# Patient Record
Sex: FEMALE | Race: WHITE | NOT HISPANIC OR LATINO | URBAN - METROPOLITAN AREA
[De-identification: names, ages, dates, MRNs, and addresses within clinical notes are randomized per-mention and may not be internally consistent; named-entity substitution may affect disease eponyms.]

---

## 2017-11-04 ENCOUNTER — OUTPATIENT (OUTPATIENT)
Dept: OUTPATIENT SERVICES | Facility: HOSPITAL | Age: 72
LOS: 1 days | End: 2017-11-04

## 2017-11-04 ENCOUNTER — INPATIENT (INPATIENT)
Facility: HOSPITAL | Age: 72
LOS: 4 days | Discharge: HOME | End: 2017-11-09
Payer: MEDICARE

## 2017-11-04 PROCEDURE — 71020: CPT | Mod: 26

## 2017-11-04 PROCEDURE — 99285 EMERGENCY DEPT VISIT HI MDM: CPT

## 2017-11-05 ENCOUNTER — OUTPATIENT (OUTPATIENT)
Dept: OUTPATIENT SERVICES | Facility: HOSPITAL | Age: 72
LOS: 1 days | End: 2017-11-05

## 2017-11-06 ENCOUNTER — OUTPATIENT (OUTPATIENT)
Dept: OUTPATIENT SERVICES | Facility: HOSPITAL | Age: 72
LOS: 1 days | End: 2017-11-06

## 2017-11-07 ENCOUNTER — OUTPATIENT (OUTPATIENT)
Dept: OUTPATIENT SERVICES | Facility: HOSPITAL | Age: 72
LOS: 1 days | End: 2017-11-07

## 2017-11-07 PROCEDURE — 71020: CPT | Mod: 26

## 2017-11-08 ENCOUNTER — OUTPATIENT (OUTPATIENT)
Dept: OUTPATIENT SERVICES | Facility: HOSPITAL | Age: 72
LOS: 1 days | End: 2017-11-08

## 2017-11-09 ENCOUNTER — OUTPATIENT (OUTPATIENT)
Dept: OUTPATIENT SERVICES | Facility: HOSPITAL | Age: 72
LOS: 1 days | End: 2017-11-09

## 2017-11-13 ENCOUNTER — OUTPATIENT (OUTPATIENT)
Dept: OUTPATIENT SERVICES | Facility: HOSPITAL | Age: 72
LOS: 1 days | End: 2017-11-13

## 2017-12-15 ENCOUNTER — OUTPATIENT (OUTPATIENT)
Dept: OUTPATIENT SERVICES | Facility: HOSPITAL | Age: 72
LOS: 1 days | End: 2017-12-15

## 2018-03-08 ENCOUNTER — OUTPATIENT (OUTPATIENT)
Dept: OUTPATIENT SERVICES | Facility: HOSPITAL | Age: 73
LOS: 1 days | End: 2018-03-08

## 2019-02-05 PROBLEM — Z00.00 ENCOUNTER FOR PREVENTIVE HEALTH EXAMINATION: Status: ACTIVE | Noted: 2019-02-05

## 2019-02-05 PROCEDURE — 70450 CT HEAD/BRAIN W/O DYE: CPT | Mod: 26

## 2019-02-05 PROCEDURE — 99285 EMERGENCY DEPT VISIT HI MDM: CPT

## 2019-02-05 PROCEDURE — 71045 X-RAY EXAM CHEST 1 VIEW: CPT | Mod: 26

## 2019-02-06 ENCOUNTER — INPATIENT (INPATIENT)
Facility: HOSPITAL | Age: 74
LOS: 5 days | Discharge: HOME | DRG: 246 | End: 2019-02-12
Attending: HOSPITALIST | Admitting: INTERNAL MEDICINE
Payer: MEDICARE

## 2019-02-06 ENCOUNTER — INPATIENT (INPATIENT)
Facility: HOSPITAL | Age: 74
LOS: 0 days | Discharge: ANOTHER HOSPITAL | End: 2019-02-06
Payer: MEDICARE

## 2019-02-06 VITALS
SYSTOLIC BLOOD PRESSURE: 131 MMHG | WEIGHT: 185.19 LBS | RESPIRATION RATE: 26 BRPM | HEART RATE: 102 BPM | OXYGEN SATURATION: 98 % | TEMPERATURE: 98 F | DIASTOLIC BLOOD PRESSURE: 62 MMHG | HEIGHT: 65 IN

## 2019-02-06 DIAGNOSIS — I31.3 PERICARDIAL EFFUSION (NONINFLAMMATORY): ICD-10-CM

## 2019-02-06 DIAGNOSIS — R06.2 WHEEZING: ICD-10-CM

## 2019-02-06 DIAGNOSIS — I42.9 CARDIOMYOPATHY, UNSPECIFIED: ICD-10-CM

## 2019-02-06 DIAGNOSIS — I21.4 NON-ST ELEVATION (NSTEMI) MYOCARDIAL INFARCTION: ICD-10-CM

## 2019-02-06 DIAGNOSIS — I25.10 ATHEROSCLEROTIC HEART DISEASE OF NATIVE CORONARY ARTERY WITHOUT ANGINA PECTORIS: ICD-10-CM

## 2019-02-06 PROCEDURE — 93306 TTE W/DOPPLER COMPLETE: CPT | Mod: 26

## 2019-02-06 PROCEDURE — 99222 1ST HOSP IP/OBS MODERATE 55: CPT

## 2019-02-06 RX ORDER — METOPROLOL TARTRATE 50 MG
25 TABLET ORAL
Qty: 0 | Refills: 0 | Status: DISCONTINUED | OUTPATIENT
Start: 2019-02-06 | End: 2019-02-08

## 2019-02-06 RX ORDER — GABAPENTIN 400 MG/1
300 CAPSULE ORAL THREE TIMES A DAY
Qty: 0 | Refills: 0 | Status: DISCONTINUED | OUTPATIENT
Start: 2019-02-06 | End: 2019-02-07

## 2019-02-06 RX ORDER — HEPARIN SODIUM 5000 [USP'U]/ML
5000 INJECTION INTRAVENOUS; SUBCUTANEOUS EVERY 8 HOURS
Qty: 0 | Refills: 0 | Status: DISCONTINUED | OUTPATIENT
Start: 2019-02-06 | End: 2019-02-07

## 2019-02-06 RX ORDER — ALBUTEROL 90 UG/1
2.5 AEROSOL, METERED ORAL EVERY 4 HOURS
Qty: 0 | Refills: 0 | Status: DISCONTINUED | OUTPATIENT
Start: 2019-02-06 | End: 2019-02-12

## 2019-02-06 RX ORDER — IPRATROPIUM/ALBUTEROL SULFATE 18-103MCG
3 AEROSOL WITH ADAPTER (GRAM) INHALATION EVERY 6 HOURS
Qty: 0 | Refills: 0 | Status: DISCONTINUED | OUTPATIENT
Start: 2019-02-06 | End: 2019-02-10

## 2019-02-06 RX ORDER — ATORVASTATIN CALCIUM 80 MG/1
80 TABLET, FILM COATED ORAL AT BEDTIME
Qty: 0 | Refills: 0 | Status: DISCONTINUED | OUTPATIENT
Start: 2019-02-06 | End: 2019-02-12

## 2019-02-06 RX ORDER — ASPIRIN/CALCIUM CARB/MAGNESIUM 324 MG
81 TABLET ORAL DAILY
Qty: 0 | Refills: 0 | Status: DISCONTINUED | OUTPATIENT
Start: 2019-02-06 | End: 2019-02-12

## 2019-02-06 RX ORDER — NORTRIPTYLINE HYDROCHLORIDE 10 MG/1
10 CAPSULE ORAL AT BEDTIME
Qty: 0 | Refills: 0 | Status: DISCONTINUED | OUTPATIENT
Start: 2019-02-06 | End: 2019-02-07

## 2019-02-06 RX ADMIN — HEPARIN SODIUM 5000 UNIT(S): 5000 INJECTION INTRAVENOUS; SUBCUTANEOUS at 23:57

## 2019-02-06 RX ADMIN — Medication 25 MILLIGRAM(S): at 23:58

## 2019-02-06 RX ADMIN — ATORVASTATIN CALCIUM 80 MILLIGRAM(S): 80 TABLET, FILM COATED ORAL at 23:57

## 2019-02-06 NOTE — PROGRESS NOTE ADULT - SUBJECTIVE AND OBJECTIVE BOX
INTERVAL HPI/OVERNIGHT EVENTS: Transferred from [facility], feels better now, wants to leave bipap off.  Chest pain and dyspnea have mostly resolved.     On Admission  02-06-19  HPI:  72 y/o female with pmhx of CAD s/p RONNY in 2008, SLE with nephropathy/CKD, depression, htn, breast and colon CA in remission since 1998 who presented to [facility] with progressive shortness of breath, chest pain, wheezing and headache. Patient has been using albuterol to help with her shortness of breath with minimal relief. Patient was admitted to ICU for respiratory distress, placed on bipap, diuresed. On bedside echo she was found to have pericardial effusion with elevated troponin to 0.144. There was no echo tech available and due for need for formal echo to r/o tamponade physiology and possible need for cath/pericardiocentesis she was transferred to [place]. Upon arrival to ICU here, patient states she feels much better. She was transitioned off of bipap onto nasal cannula and is tolerating well without respiratory distress and satting 97%. Bedside echo here reveals trace pericardial effusion with no tamponade physiology appreciated. There is some evidence of cardiomyopathy and decreased LV apical systolic function.   CT head done at [facility] [place] [place] negative for acute pathology.       PAST MEDICAL & SURGICAL HISTORY:  CKD (chronic kidney disease)  Other systemic lupus erythematosus with other organ involvement  Hypertension  Depression  Breast cancer  Colon cancer  Coronary artery disease    Social Hx: lives with , no tobacco or ETOH abuse  FamHx: reviewed, noncontributory     Home meds:  amlodipine 10 mg daily  naltrexone 4 mg daily  gabapentin 300 mg BID  ASA 81 mg daily  nortriptyline 10 mg HS  Verapamil  mg in AM and 180 mg at night    Antimicrobial:    Cardiovascular:  metoprolol tartrate 25 milliGRAM(s) Oral two times a day    Pulmonary:  ALBUTerol    0.083% 2.5 milliGRAM(s) Nebulizer every 4 hours PRN  ALBUTerol/ipratropium for Nebulization 3 milliLiter(s) Nebulizer every 6 hours    Hematalogic:  aspirin  chewable 81 milliGRAM(s) Oral daily  heparin  Injectable 5000 Unit(s) SubCutaneous every 8 hours    Other:  atorvastatin 80 milliGRAM(s) Oral at bedtime  gabapentin 300 milliGRAM(s) Oral three times a day  nortriptyline 10 milliGRAM(s) Oral at bedtime  predniSONE   Tablet 50 milliGRAM(s) Oral daily      Drug Dosing Weight    VSS    PHYSICAL EXAM:    GENERAL: NAD, well groomed,   HEAD:  Atraumatic, normocephalic  EYES: EOMI, PERRL, sclera and conjunctiva clear  ENMT:  MMM, No oropharyngeal erythema or exudates  NECK:  Supple, normal appearance, trachea midline, no JVD noted  NERVOUS SYSTEM:  Alert & Oriented X3, Symmetric strength in all extremities    CHEST/LUNG: Bilateral air entry, no chest deformity, no wheeze anteriorly  HEART: Regular rate, regular rhythm;   ABDOMEN: Soft, Nontender, Nondistended; No rebound or guarding, bowel sounds present  EXTREMITIES:  trace edema, no clubbing, no cyanosis.  LYMPH:  No lymphadenopathy noted  SKIN:  No visible rashes or skin lesions, good capillary refill  PSYCH: appropriate affect and behavior    Bedside echo shows a hypokinetic left ventricular apex, +mitral regurg, no large pericardial effusion is noted    Labs and cxr pending

## 2019-02-06 NOTE — H&P ADULT - NSHPPHYSICALEXAM_GEN_ALL_CORE
General: Normal appearing in no acute distress resting comfortably in bed. Well nourished and well groomed.  Head: Normocephalic, atraumatic.   EENT: Sclera white. PERRL, EOM intact. Secretions normal. no cervical lymphadenopathy  PULM: Breath sounds clear to auscultation symmetrically throughout all lung fields. No wheezing, rhonchi, or rales. No use of accessory muscles.  CVS: Regular rate and rhythm. No murmurs or rubs. Pulses 2+ on upper and lower extremities bilaterally.   GI: nondistended with bowel sounds normoactive in all four quadrants. No tenderness to light or deep palpation.   Neuro: non-focal. A&O x 3. moves all 4 extremities spontaneously.   Skin: No lesions, rashes, ulcers. Extremities equally warm bilaterally.    POCUS: small pericardial effusion, LV apical systolic dysfunction, IVC with inspiratory variation. ? apical ballooning of LV. LV >RV. no tamponade physiology. General: Normal appearing in no acute distress resting comfortably in bed. Well nourished and well groomed.  Head: Normocephalic, atraumatic.   EENT: Sclera white. PERRL, EOM intact. Secretions normal. no cervical lymphadenopathy  PULM: Breath sounds clear to auscultation symmetrically throughout all lung fields. No wheezing, rhonchi, or rales. No use of accessory muscles.  CVS: Regular rate and rhythm. No murmurs or rubs. Pulses 2+ on upper and lower extremities bilaterally.   GI: nondistended with bowel sounds normoactive in all four quadrants. No tenderness to light or deep palpation.   Neuro: non-focal. A&O x 3. moves all 4 extremities spontaneously.   Skin: No lesions, rashes, ulcers. Extremities equally warm bilaterally.    POCUS: small pericardial effusion, LV apical systolic dysfunction, IVC with inspiratory variation. hypokinetic LV apex. LV >RV. no tamponade physiology.

## 2019-02-06 NOTE — H&P ADULT - ATTENDING COMMENTS
Agree with above, see my note from today.  Patient hemodynamically stable, no evidence of pericardial tamponade on my bedside US.

## 2019-02-06 NOTE — H&P ADULT - PMH
Breast cancer    CKD (chronic kidney disease)    Colon cancer    Coronary artery disease    Depression    Hypertension    Other systemic lupus erythematosus with other organ involvement

## 2019-02-06 NOTE — H&P ADULT - ASSESSMENT
72 y/o female with pmhx of CAD s/p RONNY in 2008, SLE with nephropathy/CKD, depression, htn, breast and colon CA admitted with shortness of breath and chest pain likely secondary to cardiomyopathy induced pulmonary edema. Patient also has small pericardial effusion without tamponade. Symptoms have improved after diuresis and bipap.

## 2019-02-06 NOTE — H&P ADULT - HISTORY OF PRESENT ILLNESS
74 y/o female with pmhx of CAD s/p RONNY in 2008, SLE with nephropathy/CKD, depression, htn, breast and colon CA in remission since 1998 [place] [facility] [place] of breath, chest pain, wheezing and headache. Patient has been using albuterol to help with her shortness of breath with minimal relief. Patient was admitted to ICU for respiratory distress, placed on bipap, diuresed. On bedside echo she was found to have pericardial effusion with elevated troponin to 0.144. There was no echo tech available and due for need for formal echo to r/o tamponade physiology and possible need for cath/pericardiocentesis she was transferred to [place]. Upon arrival to ICU here, patient states she feels much better. She was transitioned off of bipap onto nasal cannula and is tolerating well without respiratory distress and satting 97%. Bedside echo here reveals small pericardial effusion with no tamponade physiology appreciated. There is some evidence of cardiomyopathy and decreased LV apical systolic function.   [place] head done at [facility] [place] for acute pathology.     Patient admits to chest pain radiating from back that has improved since [facility] [place] but is still present. Denies n/v. 72 y/o female with pmhx of CAD s/p RONNY in 2008, SLE with nephropathy/CKD, depression, htn, breast and colon CA in remission since 1998 who presented to [facility] [place] with progressive shortness of breath, chest pain, wheezing and headache. Patient has been using albuterol to help with her shortness of breath with minimal relief. Patient was admitted to ICU for respiratory distress, placed on bipap, diuresed. On bedside echo she was found to have pericardial effusion with elevated troponin to 0.144. There was no echo tech available and due for need for formal echo to r/o tamponade physiology and possible need for cath/pericardiocentesis she was transferred to [place]. Upon arrival to ICU here, patient states she feels much better. She was transitioned off of bipap onto nasal cannula and is tolerating well without respiratory distress and satting 97%. Bedside echo here reveals small pericardial effusion with no tamponade physiology appreciated. There is some evidence of cardiomyopathy and LV apex hypokinesis.  CT head done at [facility] [place] negative for acute pathology.     Patient admits to chest pain radiating from back that has improved since [facility] [place] but is still present. Denies n/v. 74 y/o female with pmhx of CAD s/p RONNY in 2008, SLE with nephropathy/CKD, depression, htn, breast and colon CA in remission since 1998 who [place] [facility] [place] of breath, chest pain, wheezing and headache. Patient has been using albuterol to help with her shortness of breath with minimal relief. Patient was admitted to ICU for respiratory distress, placed on bipap, diuresed. On bedside echo she was found to have pericardial effusion with elevated troponin to 0.144 and new LBBB on EKG. There was no echo tech available and due for need for formal echo to r/o tamponade physiology and possible need for cath/pericardiocentesis she was transferred to [place]. Upon arrival to ICU here, patient states she feels much better. She was transitioned off of bipap onto nasal cannula and is tolerating well without respiratory distress and satting 97%. Bedside echo here reveals small pericardial effusion with no tamponade physiology appreciated. There is some evidence of cardiomyopathy and LV apex hypokinesis.  CT head done at [facility] [place] negative for acute pathology.     Patient admits to chest pain radiating from back that has improved since [facility] [place] but is still present. Denies n/v.

## 2019-02-06 NOTE — H&P ADULT - PROBLEM SELECTOR PLAN 2
-aspirin, statin, metoprolol started. will likely need cath tomorrow. -aspirin, statin, metoprolol started. will likely need cath tomorrow. holding ace for now given renal function and need for cath.

## 2019-02-06 NOTE — PROGRESS NOTE ADULT - ASSESSMENT
72 yo female with hx of CAD s/p PCI, SLE, CKD, HTN, remote breast and colon cancer, presented to [facility] [place] on 2/5 with dyspnea, wheezing and substernal chest pain.  Transferred to [facility] on 2/6 with NSTEMI, acute systolic CHF exacerbation.

## 2019-02-06 NOTE — H&P ADULT - PROBLEM SELECTOR PLAN 1
-stat labs sent including cardiac enzymes and bnp. stat TTE. patient appears stable at this time with no need for urgent cath. will need ischemic workup though, keep npo after midnight.   -patient currently normotensive with improvement in chest pain since admission. if chest pain with radiation to back worsens will get CT scan though low clinical suspicion for dissection at this time and with risk of contrast induced nephropathy, especially with cath planned, will hold off for now.

## 2019-02-07 DIAGNOSIS — N17.9 ACUTE KIDNEY FAILURE, UNSPECIFIED: ICD-10-CM

## 2019-02-07 DIAGNOSIS — J96.01 ACUTE RESPIRATORY FAILURE WITH HYPOXIA: ICD-10-CM

## 2019-02-07 LAB
ALBUMIN SERPL ELPH-MCNC: 3.9 G/DL — SIGNIFICANT CHANGE UP (ref 3.3–5.2)
ALP SERPL-CCNC: 125 U/L — HIGH (ref 40–120)
ALT FLD-CCNC: 25 U/L — SIGNIFICANT CHANGE UP
ANION GAP SERPL CALC-SCNC: 15 MMOL/L — SIGNIFICANT CHANGE UP (ref 5–17)
ANISOCYTOSIS BLD QL: SLIGHT — SIGNIFICANT CHANGE UP
APTT BLD: 26.7 SEC — LOW (ref 27.5–36.3)
APTT BLD: >200 SEC — CRITICAL HIGH (ref 27.5–36.3)
AST SERPL-CCNC: 41 U/L — HIGH
BILIRUB SERPL-MCNC: 0.3 MG/DL — LOW (ref 0.4–2)
BUN SERPL-MCNC: 53 MG/DL — HIGH (ref 8–20)
CALCIUM SERPL-MCNC: 9.7 MG/DL — SIGNIFICANT CHANGE UP (ref 8.6–10.2)
CHLORIDE SERPL-SCNC: 105 MMOL/L — SIGNIFICANT CHANGE UP (ref 98–107)
CK MB CFR SERPL CALC: 16.3 NG/ML — HIGH (ref 0–6.7)
CK MB CFR SERPL CALC: 20.4 NG/ML — HIGH (ref 0–6.7)
CK SERPL-CCNC: 167 U/L — SIGNIFICANT CHANGE UP (ref 25–170)
CK SERPL-CCNC: 201 U/L — HIGH (ref 25–170)
CK SERPL-CCNC: 241 U/L — HIGH (ref 25–170)
CO2 SERPL-SCNC: 22 MMOL/L — SIGNIFICANT CHANGE UP (ref 22–29)
CREAT SERPL-MCNC: 2.8 MG/DL — HIGH (ref 0.5–1.3)
GLUCOSE SERPL-MCNC: 240 MG/DL — HIGH (ref 70–115)
HCT VFR BLD CALC: 29.6 % — LOW (ref 37–47)
HCT VFR BLD CALC: 30.4 % — LOW (ref 37–47)
HGB BLD-MCNC: 9.5 G/DL — LOW (ref 12–16)
HGB BLD-MCNC: 9.8 G/DL — LOW (ref 12–16)
INR BLD: 1.05 RATIO — SIGNIFICANT CHANGE UP (ref 0.88–1.16)
LYMPHOCYTES # BLD AUTO: 2 % — LOW (ref 20–55)
MACROCYTES BLD QL: SLIGHT — SIGNIFICANT CHANGE UP
MAGNESIUM SERPL-MCNC: 2.1 MG/DL — SIGNIFICANT CHANGE UP (ref 1.6–2.6)
MCHC RBC-ENTMCNC: 30.4 PG — SIGNIFICANT CHANGE UP (ref 27–31)
MCHC RBC-ENTMCNC: 30.4 PG — SIGNIFICANT CHANGE UP (ref 27–31)
MCHC RBC-ENTMCNC: 32.1 G/DL — SIGNIFICANT CHANGE UP (ref 32–36)
MCHC RBC-ENTMCNC: 32.2 G/DL — SIGNIFICANT CHANGE UP (ref 32–36)
MCV RBC AUTO: 94.4 FL — SIGNIFICANT CHANGE UP (ref 81–99)
MCV RBC AUTO: 94.6 FL — SIGNIFICANT CHANGE UP (ref 81–99)
MICROCYTES BLD QL: SLIGHT — SIGNIFICANT CHANGE UP
MONOCYTES NFR BLD AUTO: 4 % — SIGNIFICANT CHANGE UP (ref 3–10)
NEUTROPHILS NFR BLD AUTO: 94 % — HIGH (ref 37–73)
NT-PROBNP SERPL-SCNC: HIGH PG/ML (ref 0–300)
NT-PROBNP SERPL-SCNC: HIGH PG/ML (ref 0–300)
OVALOCYTES BLD QL SMEAR: SLIGHT — SIGNIFICANT CHANGE UP
PHOSPHATE SERPL-MCNC: 4.4 MG/DL — SIGNIFICANT CHANGE UP (ref 2.4–4.7)
PLAT MORPH BLD: NORMAL — SIGNIFICANT CHANGE UP
PLATELET # BLD AUTO: 268 K/UL — SIGNIFICANT CHANGE UP (ref 150–400)
PLATELET # BLD AUTO: 292 K/UL — SIGNIFICANT CHANGE UP (ref 150–400)
POIKILOCYTOSIS BLD QL AUTO: SLIGHT — SIGNIFICANT CHANGE UP
POTASSIUM SERPL-MCNC: 4 MMOL/L — SIGNIFICANT CHANGE UP (ref 3.5–5.3)
POTASSIUM SERPL-SCNC: 4 MMOL/L — SIGNIFICANT CHANGE UP (ref 3.5–5.3)
PROT SERPL-MCNC: 6.5 G/DL — LOW (ref 6.6–8.7)
PROTHROM AB SERPL-ACNC: 12.1 SEC — SIGNIFICANT CHANGE UP (ref 10–12.9)
RBC # BLD: 3.13 M/UL — LOW (ref 4.4–5.2)
RBC # BLD: 3.22 M/UL — LOW (ref 4.4–5.2)
RBC # FLD: 12.7 % — SIGNIFICANT CHANGE UP (ref 11–15.6)
RBC # FLD: 13.2 % — SIGNIFICANT CHANGE UP (ref 11–15.6)
RBC BLD AUTO: ABNORMAL
SODIUM SERPL-SCNC: 142 MMOL/L — SIGNIFICANT CHANGE UP (ref 135–145)
TROPONIN T SERPL-MCNC: 0.6 NG/ML — HIGH (ref 0–0.06)
TROPONIN T SERPL-MCNC: 0.64 NG/ML — HIGH (ref 0–0.06)
TROPONIN T SERPL-MCNC: 0.76 NG/ML — HIGH (ref 0–0.06)
WBC # BLD: 28 K/UL — HIGH (ref 4.8–10.8)
WBC # BLD: 29.7 K/UL — HIGH (ref 4.8–10.8)
WBC # FLD AUTO: 28 K/UL — HIGH (ref 4.8–10.8)
WBC # FLD AUTO: 29.7 K/UL — HIGH (ref 4.8–10.8)

## 2019-02-07 PROCEDURE — 71045 X-RAY EXAM CHEST 1 VIEW: CPT | Mod: 26

## 2019-02-07 PROCEDURE — 76775 US EXAM ABDO BACK WALL LIM: CPT | Mod: 26

## 2019-02-07 PROCEDURE — 99223 1ST HOSP IP/OBS HIGH 75: CPT

## 2019-02-07 PROCEDURE — 93010 ELECTROCARDIOGRAM REPORT: CPT

## 2019-02-07 PROCEDURE — 99233 SBSQ HOSP IP/OBS HIGH 50: CPT

## 2019-02-07 RX ORDER — NORTRIPTYLINE HYDROCHLORIDE 10 MG/1
1 CAPSULE ORAL
Qty: 0 | Refills: 0 | COMMUNITY

## 2019-02-07 RX ORDER — HEPARIN SODIUM 5000 [USP'U]/ML
6500 INJECTION INTRAVENOUS; SUBCUTANEOUS ONCE
Qty: 0 | Refills: 0 | Status: COMPLETED | OUTPATIENT
Start: 2019-02-07 | End: 2019-02-07

## 2019-02-07 RX ORDER — FLUTICASONE FUROATE AND VILANTEROL TRIFENATATE 100; 25 UG/1; UG/1
1 POWDER RESPIRATORY (INHALATION)
Qty: 0 | Refills: 0 | COMMUNITY

## 2019-02-07 RX ORDER — GABAPENTIN 400 MG/1
300 CAPSULE ORAL
Qty: 0 | Refills: 0 | Status: DISCONTINUED | OUTPATIENT
Start: 2019-02-07 | End: 2019-02-12

## 2019-02-07 RX ORDER — GABAPENTIN 400 MG/1
900 CAPSULE ORAL AT BEDTIME
Qty: 0 | Refills: 0 | Status: DISCONTINUED | OUTPATIENT
Start: 2019-02-07 | End: 2019-02-07

## 2019-02-07 RX ORDER — SODIUM CHLORIDE 9 MG/ML
1000 INJECTION INTRAMUSCULAR; INTRAVENOUS; SUBCUTANEOUS
Qty: 0 | Refills: 0 | Status: DISCONTINUED | OUTPATIENT
Start: 2019-02-08 | End: 2019-02-09

## 2019-02-07 RX ORDER — NORTRIPTYLINE HYDROCHLORIDE 10 MG/1
10 CAPSULE ORAL AT BEDTIME
Qty: 0 | Refills: 0 | Status: DISCONTINUED | OUTPATIENT
Start: 2019-02-07 | End: 2019-02-12

## 2019-02-07 RX ORDER — INFLUENZA VIRUS VACCINE 15; 15; 15; 15 UG/.5ML; UG/.5ML; UG/.5ML; UG/.5ML
0.5 SUSPENSION INTRAMUSCULAR ONCE
Qty: 0 | Refills: 0 | Status: COMPLETED | OUTPATIENT
Start: 2019-02-07 | End: 2019-02-12

## 2019-02-07 RX ORDER — DULOXETINE HYDROCHLORIDE 30 MG/1
30 CAPSULE, DELAYED RELEASE ORAL DAILY
Qty: 0 | Refills: 0 | Status: DISCONTINUED | OUTPATIENT
Start: 2019-02-07 | End: 2019-02-12

## 2019-02-07 RX ORDER — HEPARIN SODIUM 5000 [USP'U]/ML
3000 INJECTION INTRAVENOUS; SUBCUTANEOUS EVERY 6 HOURS
Qty: 0 | Refills: 0 | Status: DISCONTINUED | OUTPATIENT
Start: 2019-02-07 | End: 2019-02-08

## 2019-02-07 RX ORDER — BACLOFEN 100 %
10 POWDER (GRAM) MISCELLANEOUS THREE TIMES A DAY
Qty: 0 | Refills: 0 | Status: DISCONTINUED | OUTPATIENT
Start: 2019-02-07 | End: 2019-02-12

## 2019-02-07 RX ORDER — NORTRIPTYLINE HYDROCHLORIDE 10 MG/1
10 CAPSULE ORAL THREE TIMES A DAY
Qty: 0 | Refills: 0 | Status: DISCONTINUED | OUTPATIENT
Start: 2019-02-07 | End: 2019-02-07

## 2019-02-07 RX ORDER — HEPARIN SODIUM 5000 [USP'U]/ML
6500 INJECTION INTRAVENOUS; SUBCUTANEOUS EVERY 6 HOURS
Qty: 0 | Refills: 0 | Status: DISCONTINUED | OUTPATIENT
Start: 2019-02-07 | End: 2019-02-08

## 2019-02-07 RX ORDER — ALBUTEROL 90 UG/1
2 AEROSOL, METERED ORAL
Qty: 0 | Refills: 0 | COMMUNITY

## 2019-02-07 RX ORDER — BACLOFEN 100 %
1 POWDER (GRAM) MISCELLANEOUS
Qty: 0 | Refills: 0 | COMMUNITY

## 2019-02-07 RX ORDER — CLOPIDOGREL BISULFATE 75 MG/1
300 TABLET, FILM COATED ORAL ONCE
Qty: 0 | Refills: 0 | Status: COMPLETED | OUTPATIENT
Start: 2019-02-07 | End: 2019-02-07

## 2019-02-07 RX ORDER — HEPARIN SODIUM 5000 [USP'U]/ML
INJECTION INTRAVENOUS; SUBCUTANEOUS
Qty: 25000 | Refills: 0 | Status: DISCONTINUED | OUTPATIENT
Start: 2019-02-07 | End: 2019-02-08

## 2019-02-07 RX ADMIN — Medication 81 MILLIGRAM(S): at 12:48

## 2019-02-07 RX ADMIN — HEPARIN SODIUM 1500 UNIT(S)/HR: 5000 INJECTION INTRAVENOUS; SUBCUTANEOUS at 10:04

## 2019-02-07 RX ADMIN — DULOXETINE HYDROCHLORIDE 30 MILLIGRAM(S): 30 CAPSULE, DELAYED RELEASE ORAL at 12:48

## 2019-02-07 RX ADMIN — Medication 3 MILLILITER(S): at 20:29

## 2019-02-07 RX ADMIN — Medication 3 MILLILITER(S): at 09:08

## 2019-02-07 RX ADMIN — GABAPENTIN 300 MILLIGRAM(S): 400 CAPSULE ORAL at 21:16

## 2019-02-07 RX ADMIN — HEPARIN SODIUM 0 UNIT(S)/HR: 5000 INJECTION INTRAVENOUS; SUBCUTANEOUS at 18:16

## 2019-02-07 RX ADMIN — NORTRIPTYLINE HYDROCHLORIDE 10 MILLIGRAM(S): 10 CAPSULE ORAL at 21:16

## 2019-02-07 RX ADMIN — Medication 25 MILLIGRAM(S): at 18:20

## 2019-02-07 RX ADMIN — ATORVASTATIN CALCIUM 80 MILLIGRAM(S): 80 TABLET, FILM COATED ORAL at 21:16

## 2019-02-07 RX ADMIN — GABAPENTIN 300 MILLIGRAM(S): 400 CAPSULE ORAL at 08:10

## 2019-02-07 RX ADMIN — Medication 3 MILLILITER(S): at 15:49

## 2019-02-07 RX ADMIN — HEPARIN SODIUM 6500 UNIT(S): 5000 INJECTION INTRAVENOUS; SUBCUTANEOUS at 10:06

## 2019-02-07 RX ADMIN — HEPARIN SODIUM 1200 UNIT(S)/HR: 5000 INJECTION INTRAVENOUS; SUBCUTANEOUS at 19:14

## 2019-02-07 RX ADMIN — Medication 25 MILLIGRAM(S): at 08:10

## 2019-02-07 RX ADMIN — CLOPIDOGREL BISULFATE 300 MILLIGRAM(S): 75 TABLET, FILM COATED ORAL at 10:05

## 2019-02-07 NOTE — PROGRESS NOTE ADULT - SUBJECTIVE AND OBJECTIVE BOX
Patient is a 73y old  Female who presents with a chief complaint of transfer from [place] for possible pericardiocentesis (07 Feb 2019 08:55)      BRIEF HOSPITAL COURSE: 4 y/o female with pmhx of CAD s/p RONNY in 2008, SLE with nephropathy/CKD, depression, htn, breast and colon CA in remission since 1998 who presented to [facility] [place] with progressive shortness of breath, chest pain, wheezing and headache. Patient has been using albuterol to help with her shortness of breath with minimal relief. Patient was admitted to ICU for respiratory distress, placed on bipap, diuresed. On bedside echo she was found to have pericardial effusion with elevated troponin to 0.144. There was no echo tech available and due for need for formal echo to r/o tamponade physiology and possible need for cath/pericardiocentesis she was transferred to [place]. Upon arrival to ICU here, patient states she feels much better. She was transitioned off of bipap onto nasal cannula and is tolerating well without respiratory distress and satting 97%. Bedside echo here reveals trace pericardial effusion with no tamponade physiology appreciated. There is some evidence of cardiomyopathy and decreased LV apical systolic function.   CT head done at [facility] [place] negative for acute pathology.       Events last 24 hours:   Overnight no events  off of BiPAP to [place]  No further CP or other complaints.     PAST MEDICAL & SURGICAL HISTORY:  CKD (chronic kidney disease)  Other systemic lupus erythematosus with other organ involvement  Hypertension  Depression  Breast cancer  Colon cancer  Coronary artery disease      11 systems reviewed, no other symptoms besides what is explained above      Physical Examination:    ICU Vital Signs Last 24 Hrs  T(C): 36.9 (07 Feb 2019 08:09), Max: 36.9 (07 Feb 2019 08:09)  T(F): 98.4 (07 Feb 2019 08:09), Max: 98.4 (07 Feb 2019 08:09)  HR: 79 (07 Feb 2019 11:00) (76 - 108)  BP: 122/57 (07 Feb 2019 11:00) (107/53 - 131/62)  BP(mean): 82 (07 Feb 2019 11:00) (77 - 103)  ABP: --  ABP(mean): --  RR: 35 (07 Feb 2019 11:00) (14 - 38)  SpO2: 97% (07 Feb 2019 11:00) (94% - 98%)      General: No acute distress.      Neuro: AAO*3, No motor, sensory, or cranial nerve deficit    HEENT: Pupils equal, reactive to light, Oral mucosa    PULM: Clear to auscultation bilaterally, no significant adventitious breath sounds     CVS: Regular rhythm and controlled rate, no murmurs, rubs, or gallops    ABD: Soft, nondistended, nontender, normoactive bowel sounds, no CVA tenderness    EXT: No b/l LE edema, nontender with pedal pulse palpable, old bruising on left calf     SKIN: Warm and well perfused, no acute rashes       Medications:    metoprolol tartrate 25 milliGRAM(s) Oral two times a day  ALBUTerol    0.083% 2.5 milliGRAM(s) Nebulizer every 4 hours PRN  ALBUTerol/ipratropium for Nebulization 3 milliLiter(s) Nebulizer every 6 hours  baclofen 10 milliGRAM(s) Oral three times a day PRN  DULoxetine 30 milliGRAM(s) Oral daily  gabapentin 300 milliGRAM(s) Oral <User Schedule>  nortriptyline 10 milliGRAM(s) Oral three times a day  aspirin  chewable 81 milliGRAM(s) Oral daily  heparin  Infusion.  Unit(s)/Hr IV Continuous <Continuous>  heparin  Injectable 6500 Unit(s) IV Push every 6 hours PRN  heparin  Injectable 3000 Unit(s) IV Push every 6 hours PRN  atorvastatin 80 milliGRAM(s) Oral at bedtime  influenza   Vaccine 0.5 milliLiter(s) IntraMuscular once      I&O's Detail    06 Feb 2019 07:01  -  07 Feb 2019 07:00  --------------------------------------------------------  IN:    Oral Fluid: 360 mL  Total IN: 360 mL    OUT:    Indwelling Catheter - Urethral: 1000 mL  Total OUT: 1000 mL    Total NET: -640 mL      07 Feb 2019 07:01  -  07 Feb 2019 11:19  --------------------------------------------------------  IN:  Total IN: 0 mL    OUT:    Indwelling Catheter - Urethral: 400 mL  Total OUT: 400 mL    Total NET: -400 mL            RADIOLOGY/ Microbiology/ Labs: reviewed     CRITICAL CARE TIME SPENT: 35 min

## 2019-02-07 NOTE — CONSULT NOTE ADULT - SUBJECTIVE AND OBJECTIVE BOX
CARDIOLOGY CONSULTATION NOTE   Consult requested by:      Reason for Consultation:     History obtained by: Patient, family and medical record     obtained: No    Chief complaint:    Patient is a 73y old  Female who presents with a chief complaint of transfer from [place] for possible pericardiocentesis (06 Feb 2019 23:06)      HPI:  74 y/o female with pmhx of CAD s/p RONNY in 2008, SLE with nephropathy/CKD, depression, htn, breast and colon CA in remission since 1998 [place] [facility] [place] of breath, chest pain, wheezing and headache. Patient has been using albuterol to help with her shortness of breath with minimal relief. Patient was admitted to ICU for respiratory distress, placed on bipap, diuresed. On bedside echo she was found to have pericardial effusion with elevated troponin to 0.144 and new LBBB on EKG. There was no echo tech available and due for need for formal echo to r/o tamponade physiology and possible need for cath/pericardiocentesis she was transferred to [place]. Upon arrival to ICU here, patient states she feels much better. She was transitioned off of bipap onto nasal cannula and is tolerating well without respiratory distress and satting 97%. Bedside echo here reveals small pericardial effusion with no tamponade physiology appreciated. There is some evidence of cardiomyopathy and LV apex hypokinesis.  CT head done at [facility] [place] negative for acute pathology.     Patient admits to chest pain radiating from back that has improved since [facility] but is still present. Denies n/v. (06 Feb 2019 22:37)    Duration:  Location:   Quality:   Severity:  Context:  Timing:  Modifying factors:  Associated symptoms:      REVIEW OF SYMPTOMS:   Constitutional: Denied: fever, chills, weight loss or gain  Eyes: Denied: reddened ayes, eye discharge, eye pain  ENMT: Denied: ear pain, nasal discharge, mouth pain, throat pain or swelling  Cardiovascular: Denied: chest pain,  Chest pressure, palpitation, arrhythmia, irregular or fast heart beats, edema  Respiratory: Denied: cough, phlegm production, wheezes, dyspnea, orthopnea, PND,   GI: Denied: Abdominal pain, nausea, vomiting, diarrhea, constipation, melena, rectal bleed  : Denied: hematuria, frequency  Musculoskeletal: Denied: Muscle aches, weakness, pain  Hematology/lymp: Denied: Bleeding disorder, anemia, blood clotting  Neuro: Denied: Headache, light headedness, dizziness, numbness, aphasia, dysarthria, seizure,  syncope, near syncope  Psych: Denied: depression, anxiety  Integumentary/skin: Denied: rash, bruises, ecchymosis, itching  Allergy/Immunology: denied environmental or drug allergies    ALL OTHER REVIEW OF SYSTEMS ARE NEGATIVE.    MEDICATIONS  (STANDING):  ALBUTerol/ipratropium for Nebulization 3 milliLiter(s) Nebulizer every 6 hours  aspirin  chewable 81 milliGRAM(s) Oral daily  atorvastatin 80 milliGRAM(s) Oral at bedtime  gabapentin 300 milliGRAM(s) Oral three times a day  heparin  Injectable 5000 Unit(s) SubCutaneous every 8 hours  influenza   Vaccine 0.5 milliLiter(s) IntraMuscular once  metoprolol tartrate 25 milliGRAM(s) Oral two times a day  nortriptyline 10 milliGRAM(s) Oral at bedtime    MEDICATIONS  (PRN):  ALBUTerol    0.083% 2.5 milliGRAM(s) Nebulizer every 4 hours PRN Wheezing        PAST MEDICAL & SURGICAL HISTORY:  CKD (chronic kidney disease)  Other systemic lupus erythematosus with other organ involvement  Hypertension  Depression  Breast cancer  Colon cancer  Coronary artery disease      FAMILY HISTORY:      SOCIAL HISTORY:    CIGARETTES:  No    ALCOHOL: No    DRUGS: No    Vital Signs Last 24 Hrs  T(C): 36.9 (07 Feb 2019 08:09), Max: 36.9 (07 Feb 2019 08:09)  T(F): 98.4 (07 Feb 2019 08:09), Max: 98.4 (07 Feb 2019 08:09)  HR: 79 (07 Feb 2019 08:00) (79 - 108)  BP: 131/60 (07 Feb 2019 08:00) (107/53 - 131/62)  BP(mean): 86 (07 Feb 2019 08:00) (77 - 103)  RR: 14 (07 Feb 2019 08:00) (14 - 31)  SpO2: 95% (07 Feb 2019 08:00) (94% - 98%)    PHYSICAL EXAM:      Constitutional: No fever, chills, NAD, Comfortable    Eyes: Not reddened, no discharge    ENMT: No discharge, No pain    Neck: No JVD, No Bruit    Back: No CVA tenderness    Respiratory: Clear to auscultation bilaterally    Cardiovascular: RRR, Normal S1-2, No S3-, No friction rub murmur    Gastrointestinal: Soft, NT/ND. BS+, No organomegaly    Extremities: No edema    Vascular: Distal pulses intact    Neurological: Alert, awake, oriented, able to move extremities, speach is clear    Skin: No ecchymosis, no rash    Musculoskeletal: Non tender, no weaknss    Psychiatric: Aproppriate mood, no anxiety        INTERPRETATION OF TELEMETRY:    ECG:    I&O's Detail    06 Feb 2019 07:01  -  07 Feb 2019 07:00  --------------------------------------------------------  IN:    Oral Fluid: 360 mL  Total IN: 360 mL    OUT:    Indwelling Catheter - Urethral: 1000 mL  Total OUT: 1000 mL    Total NET: -640 mL      07 Feb 2019 07:01  -  07 Feb 2019 08:56  --------------------------------------------------------  IN:  Total IN: 0 mL    OUT:    Indwelling Catheter - Urethral: 250 mL  Total OUT: 250 mL    Total NET: -250 mL      LABS:                        9.8    28.0  )-----------( 268      ( 07 Feb 2019 00:54 )             30.4     02-07  142  |  105  |  53.0<H>  ----------------------------<  240<H>  4.0   |  22.0  |  2.80<H>      Ca    9.7      07 Feb 2019 00:54  Phos  4.4     02-07  Mg     2.1     02-07      TPro  6.5<L>  /  Alb  3.9  /  TBili  0.3<L>  /  DBili  x   /  AST  41<H>  /  ALT  25  /  AlkPhos  125<H>  02-07      CARDIAC MARKERS ( 07 Feb 2019 07:34 )  x     / 0.64 ng/mL / 201 U/L / x     / 16.3 ng/mL  CARDIAC MARKERS ( 07 Feb 2019 00:54 )  x     / 0.60 ng/mL / 241 U/L / x     / 20.4 ng/mL      PT/INR - ( 07 Feb 2019 01:07 )   PT: 12.1 sec;   INR: 1.05 ratio    PTT - ( 07 Feb 2019 01:07 )  PTT:26.7 sec      I&O's Summary  06 Feb 2019 07:01  -  07 Feb 2019 07:00  --------------------------------------------------------  IN: 360 mL / OUT: 1000 mL / NET: -640 mL    07 Feb 2019 07:01  -  07 Feb 2019 08:56  --------------------------------------------------------  IN: 0 mL / OUT: 250 mL / NET: -250 mL        RADIOLOGY & ADDITIONAL STUDIES:  X-ray:    PREVIOUS DIAGNOSTIC TESTING:      ECHO  FINDINGS:     < from: TTE Echo Complete w/Doppler (02.06.19 @ 22:43) >  PHYSICIAN INTERPRETATION:  Left Ventricle: Endocardial visualization was enhanced with intravenous   echo contrast. The left ventricular internal cavity size is normal.There   is mid to distal anterior, anteroseptal, apical hypokinesis to akinesis.   Global LV systolic function was severely decreased. Left ventricular   ejection fraction, by visual estimation, is 20 to 25%. The mitral in-flow   pattern reveals no discernable A-wave, therefore no comment on diastolic   function can be made.  Right Ventricle: Theright ventricular size is normal. RV systolic   function is normal.  Left Atrium: Mildly enlarged left atrium.  Right Atrium: Normal right atrial size.  Pericardium: A small pericardial effusion is present. The pericardial   effusion is globally located around the entire heart. There is no   evidence of cardiac tamponade.  Mitral Valve: Thickening of the anterior and posterior mitral valve   leaflets. There is mild mitral annular calcification. Peak transmitral   mean gradient equals 2.9 mmHg, calculated mitral valve area by pressure   half time equals 8.43 cm² consistent with No evidence of mitral stenosis.   Moderate mitral valve regurgitation is seen.  Tricuspid Valve: The tricuspid valve is normal. No tricuspid   regurgitation is visualized. Adequate TR velocity was not obtained to   accurately assess RVSP.  Aortic Valve: The aortic valve was not well visualized. Sclerotic aortic   valve with normal opening. Trivial aortic valve regurgitation is seen.  Pulmonic Valve: The pulmonic valve was not well visualized. Trace   pulmonic valve regurgitation.  Aorta: The aortic root is normal in size and structure.  Pulmonary Artery: The pulmonary artery is of normal size and origin.  Venous: The inferior vena cava is normal. The inferior vena cava was   normal sized, with respiratory size variation greater than 50%.       Summary:   1. Endocardial visualization was enhanced with intravenous echo contrast.   2. Severely decreased global left ventricular systolic function. Left   ventricular ejection fraction, by visual estimation, is 20 to 25%. Wall   motion abnormalities suggestive of wrap around mid LAD infarct.   3. There is mild concentric left ventricular hypertrophy.   4. RV systolic function is normal.   5. Moderate mitral regurgitation.   6. Small pericardial effusion without tamponade physiology.   7. ** No prior echocardiograms available for comparison.    [id number] Tamie Mendosa DO, Electronically signed on 2/7/2019 at   8:48:28 AM    < end of copied text > CARDIOLOGY CONSULTATION NOTE   Consult requested by:  Dr. Schmid    Reason for Consultation: possible pericardiocentesis, new LBBB, chest pain     History obtained by: Patient, family and medical record     obtained: No    Chief complaint:    Patient is a 73y old  Female who presents with a chief complaint of transfer from [place] for possible pericardiocentesis (06 Feb 2019 23:06)      HPI: 74 y/o female with pmhx of CAD s/p RONNY in 2008, SLE with nephropathy/CKD, depression, htn, breast and colon CA in remission since 1998 [place] [facility] [place], chest pain, wheezing and headache. Patient has been using albuterol to help with her shortness of breath with minimal relief. Patient was admitted to ICU for respiratory distress, placed on Bipap, diuresed. Bedside echo she was found to have pericardial effusion with elevated troponin to 0.144 and new LBBB on EKG. Pt transferred to [facility] for further management. Transitioned off BiPap to NC, tolerating well without respiratory distress. Repeat ECHO shows small pericardial effusion without tamponade physiology, EF 20-25%, wall motion abnormality concerning for LAD infarct. PT with worsening kidney function, Cr 2.1 --> 2.8.     Pt seen and examined in MICU, states feels much better. States pain, shortness of breath, dyspnea on exertion has been getting progressively worse over past few weeks. Describes chest pain as "tightness" wrapping around to back, associated with diaphoresis and difficulty breathing when pain occurs. Saw PMD, sent for "VQ scan" that was negative. Currently denies fever, chills, cough, phlegm production, shortness of breath, dyspnea on exertion, orthopnea, PND, edema, chest pain, pressure, palpitations, irregular, fast heart beat, nausea, vomiting, melena, rectal bleed, hematuria, lightheadedness, dizziness, syncope, near syncope.      REVIEW OF SYMPTOMS:   Constitutional: Denied: fever, chills, weight loss or gain  Eyes: Denied: reddened ayes, eye discharge, eye pain  ENMT: Denied: ear pain, nasal discharge, mouth pain, throat pain or swelling  Cardiovascular: Denied: chest pain,  Chest pressure, palpitation, arrhythmia, irregular or fast heart beats, edema  Respiratory: Denied: cough, phlegm production, wheezes, dyspnea, orthopnea, PND,   GI: Denied: Abdominal pain, nausea, vomiting, diarrhea, constipation, melena, rectal bleed  : Denied: hematuria, frequency  Musculoskeletal: Denied: Muscle aches, weakness, pain  Hematology/lymp: Denied: Bleeding disorder, anemia, blood clotting  Neuro: Denied: Headache, light headedness, dizziness, numbness, aphasia, dysarthria, seizure,  syncope, near syncope  Psych: Denied: depression, anxiety  Integumentary/skin: Denied: rash, bruises, ecchymosis, itching  Allergy/Immunology: denied environmental or drug allergies      ALL OTHER REVIEW OF SYSTEMS ARE NEGATIVE.    MEDICATIONS  (STANDING):  ALBUTerol/ipratropium for Nebulization 3 milliLiter(s) Nebulizer every 6 hours  aspirin  chewable 81 milliGRAM(s) Oral daily  atorvastatin 80 milliGRAM(s) Oral at bedtime  gabapentin 300 milliGRAM(s) Oral three times a day  heparin  Injectable 5000 Unit(s) SubCutaneous every 8 hours  influenza   Vaccine 0.5 milliLiter(s) IntraMuscular once  metoprolol tartrate 25 milliGRAM(s) Oral two times a day  nortriptyline 10 milliGRAM(s) Oral at bedtime    MEDICATIONS  (PRN):  ALBUTerol    0.083% 2.5 milliGRAM(s) Nebulizer every 4 hours PRN Wheezing        PAST MEDICAL & SURGICAL HISTORY:  CKD (chronic kidney disease)  Other systemic lupus erythematosus with other organ involvement  Hypertension  Depression  Breast cancer  Colon cancer  Coronary artery disease      FAMILY HISTORY:      SOCIAL HISTORY:  CIGARETTES:  No  ALCOHOL: No  DRUGS: No      Vital Signs Last 24 Hrs  T(C): 36.9 (07 Feb 2019 08:09), Max: 36.9 (07 Feb 2019 08:09)  T(F): 98.4 (07 Feb 2019 08:09), Max: 98.4 (07 Feb 2019 08:09)  HR: 79 (07 Feb 2019 08:00) (79 - 108)  BP: 131/60 (07 Feb 2019 08:00) (107/53 - 131/62)  BP(mean): 86 (07 Feb 2019 08:00) (77 - 103)  RR: 14 (07 Feb 2019 08:00) (14 - 31)  SpO2: 95% (07 Feb 2019 08:00) (94% - 98%)    PHYSICAL EXAM:      Constitutional: No fever, chills, NAD, Comfortable    Eyes: Not reddened, no discharge    ENMT: No discharge, No pain    Neck: No JVD, No Bruit    Back: No CVA tenderness    Respiratory: Clear to auscultation bilaterally    Cardiovascular: RRR, Normal S1-2, No S3-, No friction rub, systolic murmur 2/6 LSB  Gastrointestinal: Soft, NT/ND. BS+, No organomegaly    Extremities: No edema    Vascular: Distal pulses intact    Neurological: Alert, awake, oriented, able to move extremities, speech is clear    Skin: No ecchymosis, no rash    Musculoskeletal: Non tender, no weakness    Psychiatric: Appropriate mood, no anxiety      INTERPRETATION OF TELEMETRY: SR  ECG: SR- LBBB      I&O's Detail    06 Feb 2019 07:01  -  07 Feb 2019 07:00  --------------------------------------------------------  IN:    Oral Fluid: 360 mL  Total IN: 360 mL    OUT:    Indwelling Catheter - Urethral: 1000 mL  Total OUT: 1000 mL    Total NET: -640 mL      07 Feb 2019 07:01  -  07 Feb 2019 08:56  --------------------------------------------------------  IN:  Total IN: 0 mL    OUT:    Indwelling Catheter - Urethral: 250 mL  Total OUT: 250 mL    Total NET: -250 mL      LABS:                        9.8    28.0  )-----------( 268      ( 07 Feb 2019 00:54 )             30.4     02-07  142  |  105  |  53.0<H>  ----------------------------<  240<H>  4.0   |  22.0  |  2.80<H>      Ca    9.7      07 Feb 2019 00:54  Phos  4.4     02-07  Mg     2.1     02-07      TPro  6.5<L>  /  Alb  3.9  /  TBili  0.3<L>  /  DBili  x   /  AST  41<H>  /  ALT  25  /  AlkPhos  125<H>  02-07      CARDIAC MARKERS ( 07 Feb 2019 07:34 )  x     / 0.64 ng/mL / 201 U/L / x     / 16.3 ng/mL  CARDIAC MARKERS ( 07 Feb 2019 00:54 )  x     / 0.60 ng/mL / 241 U/L / x     / 20.4 ng/mL      PT/INR - ( 07 Feb 2019 01:07 )   PT: 12.1 sec;   INR: 1.05 ratio    PTT - ( 07 Feb 2019 01:07 )  PTT:26.7 sec      I&O's Summary  06 Feb 2019 07:01  -  07 Feb 2019 07:00  --------------------------------------------------------  IN: 360 mL / OUT: 1000 mL / NET: -640 mL    07 Feb 2019 07:01  -  07 Feb 2019 08:56  --------------------------------------------------------  IN: 0 mL / OUT: 250 mL / NET: -250 mL        RADIOLOGY & ADDITIONAL STUDIES:  X-ray:    PREVIOUS DIAGNOSTIC TESTING:      ECHO  FINDINGS:     < from: TTE Echo Complete w/Doppler (02.06.19 @ 22:43) >  PHYSICIAN INTERPRETATION:  Left Ventricle: Endocardial visualization was enhanced with intravenous   echo contrast. The left ventricular internal cavity size is normal.There   is mid to distal anterior, anteroseptal, apical hypokinesis to akinesis.   Global LV systolic function was severely decreased. Left ventricular   ejection fraction, by visual estimation, is 20 to 25%. The mitral in-flow   pattern reveals no discernable A-wave, therefore no comment on diastolic   function can be made.  Right Ventricle: Theright ventricular size is normal. RV systolic   function is normal.  Left Atrium: Mildly enlarged left atrium.  Right Atrium: Normal right atrial size.  Pericardium: A small pericardial effusion is present. The pericardial   effusion is globally located around the entire heart. There is no   evidence of cardiac tamponade.  Mitral Valve: Thickening of the anterior and posterior mitral valve   leaflets. There is mild mitral annular calcification. Peak transmitral   mean gradient equals 2.9 mmHg, calculated mitral valve area by pressure   half time equals 8.43 cm² consistent with No evidence of mitral stenosis.   Moderate mitral valve regurgitation is seen.  Tricuspid Valve: The tricuspid valve is normal. No tricuspid   regurgitation is visualized. Adequate TR velocity was not obtained to   accurately assess RVSP.  Aortic Valve: The aortic valve was not well visualized. Sclerotic aortic   valve with normal opening. Trivial aortic valve regurgitation is seen.  Pulmonic Valve: The pulmonic valve was not well visualized. Trace   pulmonic valve regurgitation.  Aorta: The aortic root is normal in size and structure.  Pulmonary Artery: The pulmonary artery is of normal size and origin.  Venous: The inferior vena cava is normal. The inferior vena cava was   normal sized, with respiratory size variation greater than 50%.       Summary:   1. Endocardial visualization was enhanced with intravenous echo contrast.   2. Severely decreased global left ventricular systolic function. Left   ventricular ejection fraction, by visual estimation, is 20 to 25%. Wall   motion abnormalities suggestive of wrap around mid LAD infarct.   3. There is mild concentric left ventricular hypertrophy.   4. RV systolic function is normal.   5. Moderate mitral regurgitation.   6. Small pericardial effusion without tamponade physiology.   7. ** No prior echocardiograms available for comparison.    [id number] Tamie Mendosa DO, Electronically signed on 2/7/2019 at   8:48:28 AM    < end of copied text >

## 2019-02-07 NOTE — PROGRESS NOTE ADULT - SUBJECTIVE AND OBJECTIVE BOX
Vital Signs Last 24 Hrs  T(C): 36.8 (2019 11:42), Max: 36.9 (2019 08:09)  T(F): 98.3 (2019 11:42), Max: 98.4 (2019 08:09)  HR: 78 (2019 14:00) (75 - 108)  BP: 138/65 (2019 14:00) (107/53 - 138/65)  BP(mean): 93 (2019 14:00) (77 - 103)  RR: 17 (2019 14:00) (14 - 38)  SpO2: 100% (2019 14:00) (94% - 100%)    142    |  105    |  53.0<H>  ----------------------------<  240<H>  Ca:9.7   (2019 00:54)  4.0     |  22.0   |  2.80<H>    eGFR if Non : 16 <L>      TPro  6.5<L>  /  Alb  3.9    /  TBili  0.3<L>  /  DBili  x      /  AST  41<H>  /  ALT  25     /  AlkPhos  125<H>  2019 00:54                              9.8<L>  28.0<H> )-----------( 268      ( 2019 00:54 )                  30.4<L>    Phos: 4.4 mg/dL M.1 mg/dL PTH:-- Uric acid:-- Serum Osm:--  Ferritin:-- Iron:-- TIBC:-- Tsat:--  B12:-- TSH:-- ( @ 00:54)    Patient w. CKD - 4 , Renal Anemia  For LHC in AM,    D/W  & [place],    IV 0.9 % saline , +/- lasix PRN,

## 2019-02-07 NOTE — CONSULT NOTE ADULT - ASSESSMENT
72 y/o female with pmhx of CAD s/p RONNY in 2008, SLE with nephropathy/CKD, depression, htn, breast and colon CA in remission since 1998 who [place] [facility] [place] with progressive shortness of breath, chest pain, wheezing and headache. Transfer from [facility] for further management possible pericardial effusion elevated troponins and new LBBB on EKG.     CAD  - Troponin Trending up  - EKG- LBBB  - Start heparin infusion  - Plavix load 600mg PO x1; start plavix 75mg PO tomorrow  - ASA 81mg daily, 325mg today if has not already received at [facility] or [facility]  - NPO tomorrow for L and R heart cath. Optimize kidney function  - TTE- EF 20-35%, wall motion abnormalities suggestive of wrap around LAD infarct.   - continue with statin     Shortness of Breath   - requiring BiPap, lasix   - resolved currently on NC tolerating well, maintaining Spo2>95%  - monitor fluid intake     CKD  - no More diuretics at this time  - Cr trending up (2/5) 2.1; today 2.8  - repeat BMP this afternoon, and tomorrow AM  - May require light hydration prior to cath   - recommend nephrology consultation  - Mucomyst given at [facility] prior to transfer    Pericardioeffusion  - TTE completed- small pericardial effusion without tamponade physiology

## 2019-02-07 NOTE — PROGRESS NOTE ADULT - SUBJECTIVE AND OBJECTIVE BOX
Patient: CHELLY HEATON [id number] 73y Female                           Internal Medicine Hospitalist Progress Note    Initial HPI:  72 y/o female with pmhx of CAD s/p RONNY in , SLE with nephropathy/CKD, depression, htn, breast and colon CA in remission since  who [place] [facility] [place] shortness of breath, chest pain, wheezing and headache.  She was admitted to MICU in respiratory distress, placed on BIPAP and diuresed.  Transferred to [facility] to r/o tamponade.  Symptoms improved, pt weaned off BIPAP.  TTE showed EF 20-25%, trace pericardial effusion with no tamponade physiology appreciated. Transferred to Hospitalist Service.     Interval History:  Pt seen with , brother at bedside.  Reports feeling much better.  No SOB.  No chest pain / palpitations. OFf BIPAP.  No additional complaints.     ____________________PHYSICAL EXAM:  Vitals reviewed as indicated below  GENERAL:  NAD Alert and Oriented x 3   HEENT: NCAT  CARDIOVASCULAR:  S1, S2  LUNGS: CTAB  ABDOMEN:  soft, (-) tenderness, (-) distension, (+) bowel sounds, (-) guarding, (-) rebound (-) rigidity  EXTREMITIES:  no cyanosis / clubbing / edema.   ____________________      BACKGROUND:  HEALTH ISSUES - PROBLEM Dx:  Acute respiratory failure with hypoxia: Acute respiratory failure with hypoxia  HARDIK (acute kidney injury): HARDIK (acute kidney injury)  Wheezing: Wheezing  Cardiomyopathy: Cardiomyopathy  NSTEMI (non-ST elevated myocardial infarction): NSTEMI (non-ST elevated myocardial infarction)  Coronary artery disease: Coronary artery disease  Pericardial effusion: Pericardial effusion        Allergies    No Known Allergies    Intolerances      PAST MEDICAL & SURGICAL HISTORY:  CKD (chronic kidney disease)  Other systemic lupus erythematosus with other organ involvement  Hypertension  Depression  Breast cancer  Colon cancer  Coronary artery disease        VITALS:  Vital Signs Last 24 Hrs  T(C): 36.8 (2019 11:42), Max: 36.9 (2019 08:09)  T(F): 98.3 (2019 11:42), Max: 98.4 (2019 08:09)  HR: 75 (2019 12:00) (75 - 108)  BP: 125/58 (2019 12:00) (107/53 - 131/62)  BP(mean): 84 (2019 12:00) (77 - 103)  RR: 31 (2019 12:00) (14 - 38)  SpO2: 97% (2019 12:00) (94% - 98%) Daily Height in cm: 165.1 (2019 22:00)    Daily Weight in k.5 (2019 04:34)  CAPILLARY BLOOD GLUCOSE        I&O's Summary    2019 07:01  -  2019 07:00  --------------------------------------------------------  IN: 360 mL / OUT: 1000 mL / NET: -640 mL    2019 07:01  -  2019 13:12  --------------------------------------------------------  IN: 0 mL / OUT: 400 mL / NET: -400 mL          LABS:                        9.8    28.0  )-----------( 268      ( 2019 00:54 )             30.4     02-07    142  |  105  |  53.0<H>  ----------------------------<  240<H>  4.0   |  22.0  |  2.80<H>    Ca    9.7      2019 00:54  Phos  4.4     02-07  Mg     2.1     02-07    TPro  6.5<L>  /  Alb  3.9  /  TBili  0.3<L>  /  DBili  x   /  AST  41<H>  /  ALT  25  /  AlkPhos  125<H>  02-07    PT/INR - ( 2019 01:07 )   PT: 12.1 sec;   INR: 1.05 ratio         PTT - ( 2019 01:07 )  PTT:26.7 sec  LIVER FUNCTIONS - ( 2019 00:54 )  Alb: 3.9 g/dL / Pro: 6.5 g/dL / ALK PHOS: 125 U/L / ALT: 25 U/L / AST: 41 U/L / GGT: x             CARDIAC MARKERS ( 2019 07:34 )  x     / 0.64 ng/mL / 201 U/L / x     / 16.3 ng/mL  CARDIAC MARKERS ( 2019 00:54 )  x     / 0.60 ng/mL / 241 U/L / x     / 20.4 ng/mL          MEDICATIONS:  MEDICATIONS  (STANDING):  ALBUTerol/ipratropium for Nebulization 3 milliLiter(s) Nebulizer every 6 hours  aspirin  chewable 81 milliGRAM(s) Oral daily  atorvastatin 80 milliGRAM(s) Oral at bedtime  DULoxetine 30 milliGRAM(s) Oral daily  gabapentin 300 milliGRAM(s) Oral <User Schedule>  heparin  Infusion.  Unit(s)/Hr (15 mL/Hr) IV Continuous <Continuous>  influenza   Vaccine 0.5 milliLiter(s) IntraMuscular once  metoprolol tartrate 25 milliGRAM(s) Oral two times a day  nortriptyline 10 milliGRAM(s) Oral three times a day    MEDICATIONS  (PRN):  ALBUTerol    0.083% 2.5 milliGRAM(s) Nebulizer every 4 hours PRN Wheezing  baclofen 10 milliGRAM(s) Oral three times a day PRN Muscle Spasm  heparin  Injectable 6500 Unit(s) IV Push every 6 hours PRN For aPTT less than 40  heparin  Injectable 3000 Unit(s) IV Push every 6 hours PRN For aPTT between 40 - 57

## 2019-02-07 NOTE — PROGRESS NOTE ADULT - ASSESSMENT
> NSTEMI - Elevated Troponin.  Awaiting Cath.  Continue ASA, Statin, BBlocker.  On Heparin  > Acute Hypoxic Respiratory Failure - now off BIPAP.  Monitor SaO2.  Supportive care.  > Acute Systolic CHF - improved after diuresis.  Monitor I&Os.  > Depression - continue Nortriptyline, Duloxetine.  > DVT prophylaxis - on Heparin > NSTEMI - Elevated Troponin.  Awaiting Cath.  Continue ASA, Statin, BBlocker.  On Heparin  > Acute Hypoxic Respiratory Failure - now off BIPAP.  Monitor SaO2.  Supportive care.  > Acute Systolic CHF - improved after diuresis.  Monitor I&Os.  > Depression - continue Nortriptyline, Duloxetine.  > Leukocytosis - no indication of active infection.  Monitor CBC  > DVT prophylaxis - on Heparin

## 2019-02-07 NOTE — CONSULT NOTE ADULT - SUBJECTIVE AND OBJECTIVE BOX
Consult requested by:  Dr. Schmid    Reason for Consultation:  HARDIK - CKD , pericardiocentesis, new LBBB, chest pain , LHC in AM,     History obtained by: Patient, family and medical record     obtained: No    Chief complaint:    Patient is a 73y old  Female who presents with a chief complaint of transfer from [facility]  for possible pericardiocentesis (06 Feb 2019 23:06)    HPI: 72 y/o female with PMH :  of CAD,  s/p RONNY in 2008, SLE with nephropathy/ CKD - 4 , depression, htn, breast and colon CA in remission since 1998 who presented to [facility] with progressive shortness of breath, chest pain, wheezing and headache. Patient has been using albuterol to help with her shortness of breath with minimal relief. Patient was admitted to ICU for respiratory distress, placed on Bipap, diuresed. Bedside echo she was found to have pericardial effusion with elevated troponin to 0.144 and new LBBB on EKG. Pt transferred to [facility] for further management. Transitioned off BiPap to NC, tolerating well without respiratory distress. Repeat ECHO shows small pericardial effusion without tamponade physiology, EF 20-25%, wall motion abnormality concerning for LAD infarct. PT with worsening kidney function, Cr 2.1 --> 2.8 mg.,     Pt seen and examined in MICU, states feels much better. States pain, shortness of breath, dyspnea on exertion has been getting progressively worse over past few weeks. Describes chest pain as "tightness" wrapping around to back, associated with diaphoresis and difficulty breathing when pain occurs. Saw PMD, sent for "VQ scan" that was negative. Currently denies fever, chills, cough, phlegm production, shortness of breath, dyspnea on exertion, orthopnea, PND, edema, chest pain, pressure, palpitations, irregular, fast heart beat, nausea, vomiting, melena, rectal bleed, hematuria, lightheadedness, dizziness, syncope, near syncope.    REVIEW OF SYMPTOMS:   Constitutional: Denied: fever, chills, weight loss or gain  Eyes: Denied: reddened ayes, eye discharge, eye pain  ENMT: Denied: ear pain, nasal discharge, mouth pain, throat pain or swelling  Cardiovascular: Denied: chest pain,  Chest pressure, palpitation, arrhythmia, irregular or fast heart beats, edema  Respiratory: Denied: cough, phlegm production, wheezes, dyspnea, orthopnea, PND,   GI: Denied: Abdominal pain, nausea, vomiting, diarrhea, constipation, melena, rectal bleed  : Denied: hematuria, frequency  Musculoskeletal: Denied: Muscle aches, weakness, pain  Hematology/lymp: Denied: Bleeding disorder, anemia, blood clotting  Neuro: Denied: Headache, light headedness, dizziness, numbness, aphasia, dysarthria, seizure,  syncope, near syncope  Psych: Denied: depression, anxiety  Integumentary/skin: Denied: rash, bruises, ecchymosis, itching  Allergy/Immunology: denied environmental or drug allergies    ALL OTHER REVIEW OF SYSTEMS ARE NEGATIVE.    MEDICATIONS  (STANDING):  ALBUTerol/ipratropium for Nebulization 3 milliLiter(s) Nebulizer every 6 hours  aspirin  chewable 81 milliGRAM(s) Oral daily  atorvastatin 80 milliGRAM(s) Oral at bedtime  gabapentin 300 milliGRAM(s) Oral three times a day  heparin  Injectable 5000 Unit(s) SubCutaneous every 8 hours  influenza   Vaccine 0.5 milliLiter(s) IntraMuscular once  metoprolol tartrate 25 milliGRAM(s) Oral two times a day  nortriptyline 10 milliGRAM(s) Oral at bedtime    PAST MEDICAL & SURGICAL HISTORY:  CKD (chronic kidney disease)  Other systemic lupus erythematosus with other organ involvement  Hypertension  Depression  Breast cancer  Colon cancer  Coronary artery disease    SOCIAL HISTORY:  CIGARETTES:  No  ALCOHOL: No  DRUGS: No    Vital Signs Last 24 Hrs  T(C): 36.9 (07 Feb 2019 08:09), Max: 36.9 (07 Feb 2019 08:09)  T(F): 98.4 (07 Feb 2019 08:09), Max: 98.4 (07 Feb 2019 08:09)  HR: 79 (07 Feb 2019 08:00) (79 - 108)  BP: 131/60 (07 Feb 2019 08:00) (107/53 - 131/62)  BP(mean): 86 (07 Feb 2019 08:00) (77 - 103)  RR: 14 (07 Feb 2019 08:00) (14 - 31)  SpO2: 95% (07 Feb 2019 08:00) (94% - 98%)    PHYSICAL EXAM:    Constitutional: No fever, chills, NAD, Comfortable    Eyes: Not reddened, no discharge    ENMT: No discharge, No pain    Neck: No JVD, No Bruit    Back: No CVA tenderness    Respiratory: Clear to auscultation bilaterally    Cardiovascular: RRR, Normal S1-2, No S3-, No friction rub, systolic murmur 2/6 LSB  Gastrointestinal: Soft, NT/ND. BS+, No organomegaly    Extremities: No edema    Vascular: Distal pulses intact    Neurological: Alert, awake, oriented, able to move extremities, speech is clear    Skin: No ecchymosis, no rash    Musculoskeletal: Non tender, no weakness    Psychiatric: Appropriate mood, no anxiety    INTERPRETATION OF TELEMETRY: SR  ECG: SR- LBBB    I&O's Detail    06 Feb 2019 07:01  -  07 Feb 2019 07:00  --------------------------------------------------------  IN:    Oral Fluid: 360 mL  Total IN: 360 mL    OUT:    Indwelling Catheter - Urethral: 1000 mL  Total OUT: 1000 mL    Total NET: -640 mL      07 Feb 2019 07:01  -  07 Feb 2019 08:56  --------------------------------------------------------  IN:  Total IN: 0 mL    OUT:    Indwelling Catheter - Urethral: 250 mL  Total OUT: 250 mL    Total NET: -250 mL      LABS:                        9.8    28.0  )-----------( 268      ( 07 Feb 2019 00:54 )             30.4     02-07  142  |  105  |  53.0<H>  ----------------------------<  240<H>  4.0   |  22.0  |  2.80<H>      Ca    9.7      07 Feb 2019 00:54  Phos  4.4     02-07  Mg     2.1     02-07      TPro  6.5<L>  /  Alb  3.9  /  TBili  0.3<L>  /  DBili  x   /  AST  41<H>  /  ALT  25  /  AlkPhos  125<H>  02-07      CARDIAC MARKERS ( 07 Feb 2019 07:34 )  x     / 0.64 ng/mL / 201 U/L / x     / 16.3 ng/mL  CARDIAC MARKERS ( 07 Feb 2019 00:54 )  x     / 0.60 ng/mL / 241 U/L / x     / 20.4 ng/mL      PT/INR - ( 07 Feb 2019 01:07 )   PT: 12.1 sec;   INR: 1.05 ratio    PTT - ( 07 Feb 2019 01:07 )  PTT:26.7 sec.,    I&O's Summary  06 Feb 2019 07:01  -  07 Feb 2019 07:00  --------------------------------------------------------  IN: 360 mL / OUT: 1000 mL / NET: -640 mL    07 Feb 2019 07:01  -  07 Feb 2019 08:56  --------------------------------------------------------  IN: 0 mL / OUT: 250 mL / NET: -250 mL    TTE , PHYSICIAN INTERPRETATION:    Left Ventricle: Endocardial visualization was enhanced with intravenous   echo contrast. The left ventricular internal cavity size is normal.There   is mid to distal anterior, anteroseptal, apical hypokinesis to akinesis.   Global LV systolic function was severely decreased. Left ventricular   ejection fraction, by visual estimation, is 20 to 25%. The mitral in-flow   pattern reveals no discernable A-wave, therefore no comment on diastolic   function can be made.  Right Ventricle: Theright ventricular size is normal. RV systolic   function is normal.  Left Atrium: Mildly enlarged left atrium.  Right Atrium: Normal right atrial size.  Pericardium: A small pericardial effusion is present. The pericardial   effusion is globally located around the entire heart. There is no   evidence of cardiac tamponade.  Mitral Valve: Thickening of the anterior and posterior mitral valve   leaflets. There is mild mitral annular calcification. Peak transmitral   mean gradient equals 2.9 mmHg, calculated mitral valve area by pressure   half time equals 8.43 cm² consistent with No evidence of mitral stenosis.   Moderate mitral valve regurgitation is seen.  Tricuspid Valve: The tricuspid valve is normal. No tricuspid   regurgitation is visualized. Adequate TR velocity was not obtained to   accurately assess RVSP.  Aortic Valve: The aortic valve was not well visualized. Sclerotic aortic   valve with normal opening. Trivial aortic valve regurgitation is seen.  Pulmonic Valve: The pulmonic valve was not well visualized. Trace   pulmonic valve regurgitation.  Aorta: The aortic root is normal in size and structure.  Pulmonary Artery: The pulmonary artery is of normal size and origin.  Venous: The inferior vena cava is normal. The inferior vena cava was   normal sized, with respiratory size variation greater than 50%.     Summary:     1. Endocardial visualization was enhanced with intravenous echo contrast.   2. Severely decreased global left ventricular systolic function. Left   ventricular ejection fraction, by visual estimation, is 20 to 25%. Wall   motion abnormalities suggestive of wrap around mid LAD infarct.   3. There is mild concentric left ventricular hypertrophy.   4. RV systolic function is normal.   5. Moderate mitral regurgitation.   6. Small pericardial effusion without tamponade physiology.        Assessment and Recommendation:   72 y/o female with pmhx of CAD s/p RONNY in 2008, SLE with nephropathy/CKD, depression, htn, breast and colon CA in remission since 1998 who presented to [facility] with progressive shortness of breath, chest pain, wheezing and headache. Transfer from [facility] for further management possible pericardial effusion elevated troponins and new LBBB on EKG.     CAD  - Troponin Trending up  - EKG- LBBB  - Start heparin infusion  - Plavix load 600mg PO x1; start plavix 75mg PO tomorrow  - ASA 81mg daily, 325mg today if has not already received at [place] or [facility]  - NPO tomorrow for L and R heart cath. Optimize kidney function  - TTE- EF 20-35%, wall motion abnormalities suggestive of wrap around LAD infarct.   - continue with statin     Shortness of Breath   - requiring BiPAP,  Lasix   - resolved currently on NC tolerating well, maintaining Spo2>95%  - monitor fluid intake     CKD  - no More diuretics at this time  - SCr trending up (2/5) 2.1; today 2.8 mg.,   - repeat BMP this afternoon, and tomorrow AM  - May require light hydration prior to cath,     Pericardial effusion  - TTE completed- small pericardial effusion without tamponade physiology     D/W Lorraine Mo * Binu,     No Role for HCO3 infusion, NAC,    Careful Hydration, Lasix PRN,    Will F/U closely,

## 2019-02-07 NOTE — PROGRESS NOTE ADULT - ASSESSMENT
1: Non-Stemi:   2: Acute hypoxic respiratory failure   3: Ischemic CMP with HfrEF  4: Acute pulmonary edema: improved    Patient seen and examined  BiPAP-> transitioned to NC (BiPAP PRN)   DAPT for now with heparin gtt; Cardio consulted, plan d/w Dr. DUNCAN-> possible cath tomorrow, statin and BB continued   Renal consulted  Can be transferred out of MICU for now

## 2019-02-08 ENCOUNTER — TRANSCRIPTION ENCOUNTER (OUTPATIENT)
Age: 74
End: 2019-02-08

## 2019-02-08 DIAGNOSIS — I50.21 ACUTE SYSTOLIC (CONGESTIVE) HEART FAILURE: ICD-10-CM

## 2019-02-08 LAB
ALBUMIN SERPL ELPH-MCNC: 3.1 G/DL — LOW (ref 3.3–5.2)
ALP SERPL-CCNC: 102 U/L — SIGNIFICANT CHANGE UP (ref 40–120)
ALT FLD-CCNC: 16 U/L — SIGNIFICANT CHANGE UP
ANION GAP SERPL CALC-SCNC: 11 MMOL/L — SIGNIFICANT CHANGE UP (ref 5–17)
APPEARANCE UR: ABNORMAL
APTT BLD: 153.5 SEC — CRITICAL HIGH (ref 27.5–36.3)
AST SERPL-CCNC: 20 U/L — SIGNIFICANT CHANGE UP
BACTERIA # UR AUTO: ABNORMAL
BILIRUB SERPL-MCNC: <0.2 MG/DL — LOW (ref 0.4–2)
BILIRUB UR-MCNC: NEGATIVE — SIGNIFICANT CHANGE UP
BUN SERPL-MCNC: 68 MG/DL — HIGH (ref 8–20)
CALCIUM SERPL-MCNC: 8.8 MG/DL — SIGNIFICANT CHANGE UP (ref 8.6–10.2)
CHLORIDE SERPL-SCNC: 106 MMOL/L — SIGNIFICANT CHANGE UP (ref 98–107)
CO2 SERPL-SCNC: 22 MMOL/L — SIGNIFICANT CHANGE UP (ref 22–29)
COLOR SPEC: YELLOW — SIGNIFICANT CHANGE UP
CREAT SERPL-MCNC: 2.34 MG/DL — HIGH (ref 0.5–1.3)
DIFF PNL FLD: ABNORMAL
EPI CELLS # UR: SIGNIFICANT CHANGE UP
GLUCOSE SERPL-MCNC: 100 MG/DL — SIGNIFICANT CHANGE UP (ref 70–115)
GLUCOSE UR QL: NEGATIVE MG/DL — SIGNIFICANT CHANGE UP
HCT VFR BLD CALC: 28.2 % — LOW (ref 37–47)
HGB BLD-MCNC: 8.8 G/DL — LOW (ref 12–16)
KETONES UR-MCNC: NEGATIVE — SIGNIFICANT CHANGE UP
LEUKOCYTE ESTERASE UR-ACNC: ABNORMAL
MAGNESIUM SERPL-MCNC: 2 MG/DL — SIGNIFICANT CHANGE UP (ref 1.6–2.6)
MCHC RBC-ENTMCNC: 30.2 PG — SIGNIFICANT CHANGE UP (ref 27–31)
MCHC RBC-ENTMCNC: 31.2 G/DL — LOW (ref 32–36)
MCV RBC AUTO: 96.9 FL — SIGNIFICANT CHANGE UP (ref 81–99)
NITRITE UR-MCNC: NEGATIVE — SIGNIFICANT CHANGE UP
PH UR: 6 — SIGNIFICANT CHANGE UP (ref 5–8)
PHOSPHATE SERPL-MCNC: 3.5 MG/DL — SIGNIFICANT CHANGE UP (ref 2.4–4.7)
PLATELET # BLD AUTO: 225 K/UL — SIGNIFICANT CHANGE UP (ref 150–400)
POTASSIUM SERPL-MCNC: 3.8 MMOL/L — SIGNIFICANT CHANGE UP (ref 3.5–5.3)
POTASSIUM SERPL-SCNC: 3.8 MMOL/L — SIGNIFICANT CHANGE UP (ref 3.5–5.3)
PROT SERPL-MCNC: 5.2 G/DL — LOW (ref 6.6–8.7)
PROT UR-MCNC: 30 MG/DL
RBC # BLD: 2.91 M/UL — LOW (ref 4.4–5.2)
RBC # FLD: 12.7 % — SIGNIFICANT CHANGE UP (ref 11–15.6)
RBC CASTS # UR COMP ASSIST: >50 /HPF (ref 0–4)
SODIUM SERPL-SCNC: 139 MMOL/L — SIGNIFICANT CHANGE UP (ref 135–145)
SP GR SPEC: 1.01 — SIGNIFICANT CHANGE UP (ref 1.01–1.02)
UROBILINOGEN FLD QL: NEGATIVE MG/DL — SIGNIFICANT CHANGE UP
WBC # BLD: 20.8 K/UL — HIGH (ref 4.8–10.8)
WBC # FLD AUTO: 20.8 K/UL — HIGH (ref 4.8–10.8)
WBC UR QL: ABNORMAL

## 2019-02-08 PROCEDURE — 99152 MOD SED SAME PHYS/QHP 5/>YRS: CPT

## 2019-02-08 PROCEDURE — 93458 L HRT ARTERY/VENTRICLE ANGIO: CPT | Mod: 26

## 2019-02-08 PROCEDURE — 99233 SBSQ HOSP IP/OBS HIGH 50: CPT

## 2019-02-08 PROCEDURE — 99233 SBSQ HOSP IP/OBS HIGH 50: CPT | Mod: 25

## 2019-02-08 RX ORDER — CLOPIDOGREL BISULFATE 75 MG/1
300 TABLET, FILM COATED ORAL ONCE
Qty: 0 | Refills: 0 | Status: COMPLETED | OUTPATIENT
Start: 2019-02-08 | End: 2019-02-08

## 2019-02-08 RX ORDER — FUROSEMIDE 40 MG
20 TABLET ORAL
Qty: 0 | Refills: 0 | Status: DISCONTINUED | OUTPATIENT
Start: 2019-02-08 | End: 2019-02-12

## 2019-02-08 RX ORDER — CARVEDILOL PHOSPHATE 80 MG/1
6.25 CAPSULE, EXTENDED RELEASE ORAL EVERY 12 HOURS
Qty: 0 | Refills: 0 | Status: DISCONTINUED | OUTPATIENT
Start: 2019-02-08 | End: 2019-02-12

## 2019-02-08 RX ORDER — CLOPIDOGREL BISULFATE 75 MG/1
75 TABLET, FILM COATED ORAL DAILY
Qty: 0 | Refills: 0 | Status: DISCONTINUED | OUTPATIENT
Start: 2019-02-09 | End: 2019-02-12

## 2019-02-08 RX ADMIN — CLOPIDOGREL BISULFATE 300 MILLIGRAM(S): 75 TABLET, FILM COATED ORAL at 13:04

## 2019-02-08 RX ADMIN — Medication 20 MILLIGRAM(S): at 17:36

## 2019-02-08 RX ADMIN — HEPARIN SODIUM 0 UNIT(S)/HR: 5000 INJECTION INTRAVENOUS; SUBCUTANEOUS at 02:15

## 2019-02-08 RX ADMIN — Medication 3 MILLILITER(S): at 20:36

## 2019-02-08 RX ADMIN — ATORVASTATIN CALCIUM 80 MILLIGRAM(S): 80 TABLET, FILM COATED ORAL at 21:52

## 2019-02-08 RX ADMIN — GABAPENTIN 300 MILLIGRAM(S): 400 CAPSULE ORAL at 21:52

## 2019-02-08 RX ADMIN — DULOXETINE HYDROCHLORIDE 30 MILLIGRAM(S): 30 CAPSULE, DELAYED RELEASE ORAL at 17:34

## 2019-02-08 RX ADMIN — Medication 25 MILLIGRAM(S): at 05:15

## 2019-02-08 RX ADMIN — HEPARIN SODIUM 900 UNIT(S)/HR: 5000 INJECTION INTRAVENOUS; SUBCUTANEOUS at 03:22

## 2019-02-08 RX ADMIN — Medication 3 MILLILITER(S): at 15:21

## 2019-02-08 RX ADMIN — CARVEDILOL PHOSPHATE 6.25 MILLIGRAM(S): 80 CAPSULE, EXTENDED RELEASE ORAL at 17:34

## 2019-02-08 RX ADMIN — NORTRIPTYLINE HYDROCHLORIDE 10 MILLIGRAM(S): 10 CAPSULE ORAL at 21:52

## 2019-02-08 RX ADMIN — SODIUM CHLORIDE 40 MILLILITER(S): 9 INJECTION INTRAMUSCULAR; INTRAVENOUS; SUBCUTANEOUS at 06:01

## 2019-02-08 RX ADMIN — Medication 81 MILLIGRAM(S): at 08:22

## 2019-02-08 NOTE — DISCHARGE NOTE ADULT - CARE PLAN
Assessment and plan of treatment:	Restricted use with no heavy lifting of affected arm for 48 hours.  No submerging the arm in water for 48 hours.  You may start showering today.  Call your doctor for any bleeding, swelling, loss of sensation in the hand or fingers, or fingers turning blue.  If heavy bleeding or large lumps form, hold pressure at the spot and come to the Emergency Room. Assessment and plan of treatment:	Restricted use with no heavy lifting of affected arm for 48 hours.  No submerging the arm in water for 48 hours.  You may start showering today.    No heavy lifting, driving, sex, tub baths, swimming, or any activity that submerges the lower half of the body in water for 48 hours.  Limited walking and stairs for 48 hours.    Change the bandaid after 24 hours and every 24 hours after that.  Keep the puncture site dry and covered with a bandaid until a scab forms.    Observe the site frequently.  If bleeding or a large lump (the size of a golf ball or bigger) occurs lie flat, apply continuous direct pressure just above the puncture site for at least 10 minutes, and notify your physician immediately.  If the bleeding cannot be controlled, call 911 immediately for assistance.  Notify your physician of pain, swelling or any drainage.    Notify your physician immediately if coldness, numbness, discoloration or pain in your foot occurs.    Call your doctor for any bleeding, swelling, loss of sensation in the hand or fingers, or fingers turning blue.  If heavy bleeding or large lumps form, hold pressure at the spot and come to the Emergency Room. Principal Discharge DX:	Acute systolic right heart failure  Goal:	maintain optimum heart function  Assessment and plan of treatment:	Restricted use with no heavy lifting of affected arm for 48 hours.  No submerging the arm in water for 48 hours.  You may start showering today.    No heavy lifting, driving, sex, tub baths, swimming, or any activity that submerges the lower half of the body in water for 48 hours.  Limited walking and stairs for 48 hours.    Change the bandaid after 24 hours and every 24 hours after that.  Keep the puncture site dry and covered with a bandaid until a scab forms.    Observe the site frequently.  If bleeding or a large lump (the size of a golf ball or bigger) occurs lie flat, apply continuous direct pressure just above the puncture site for at least 10 minutes, and notify your physician immediately.  If the bleeding cannot be controlled, call 911 immediately for assistance.  Notify your physician of pain, swelling or any drainage.    Notify your physician immediately if coldness, numbness, discoloration or pain in your foot occurs.    Call your doctor for any bleeding, swelling, loss of sensation in the hand or fingers, or fingers turning blue.  If heavy bleeding or large lumps form, hold pressure at the spot and come to the Emergency Room.

## 2019-02-08 NOTE — DISCHARGE NOTE ADULT - MEDICATION SUMMARY - MEDICATIONS TO CHANGE
I will SWITCH the dose or number of times a day I take the medications listed below when I get home from the hospital:    gabapentin 300 mg oral capsule  -- 1 cap(s) by mouth in AM  3 caps at bedtime

## 2019-02-08 NOTE — DISCHARGE NOTE ADULT - HOSPITAL COURSE
74 y/o female with pmhx of CAD s/p RONNY in 2008, SLE with nephropathy/CKD, depression, htn, breast and colon CA in remission since 1998 who [place] [facility] [place] shortness of breath, chest pain, wheezing and headache.  Had LHC 2/8/2019 which showed Mid LAD 80% stenosis, yesterday went for stagged PCI and received a stent to LAD RONNY x 1 via left groin.  Left groin with bruising noted.  Will to a ultrasound, if negative may d,c and follow up outpatient.       Problem/Plan - 1:  ·  Problem: Acute systolic right heart failure.  Plan: Ct lasix 20 mg po bid and correg. Stable.       Problem/Plan - 2:  ·  Problem: NSTEMI (non-ST elevated myocardial infarction).  Plan: Patient with mid LAD disease.    DAPT:  Aspirin and Plavix  Continue bb, statin. D.C verapermil  Ultrasound left groin r.o hematoma/psuedu, negative  Follow up with cardiology 1 week [place]   Problem/Plan - 3:  ·  Problem: HARDIK (acute kidney injury).  Stable     Problem/Plan - 4:  ·  Problem: Acute respiratory failure with hypoxia.  Resolved.

## 2019-02-08 NOTE — PROGRESS NOTE ADULT - SUBJECTIVE AND OBJECTIVE BOX
73y Female who had a LHC which showed Mid LAD 80% stenosis, Right radial band in place. Patient awake and alert without complaints. Denies chest pain, sob, palps.       Neuro: A&OX3  Lungs: CTA B/L  CV: S1, S2, no murmur, RRR  Abd: Soft  Right Wrist no bleeding, no hematoma, no ecchymosis  Extremity: + distal pulses, cap refill < 3 sec      A/P: 73y Female s/p LHC no intervention  1. Wrist management discussed with patient  2. Continue current meds  3. Follow up as an outpatient with cardiologist  4. Bedrest x 1 hour  5. Remove radial band in 1 hour  6. Staged PCI to LAD on monday

## 2019-02-08 NOTE — PROGRESS NOTE ADULT - SUBJECTIVE AND OBJECTIVE BOX
[facility] DIVISION OF KIDNEY DISEASES AND HYPERTENSION -- FOLLOW UP NOTE  --------------------------------------------------------------------------------  Chief Complaint: pericardial effusion    24 hour events/subjective:  Seen; examined  LHC/RHC today    PAST HISTORY  --------------------------------------------------------------------------------  No significant changes to PMH, PSH, FHx, SHx, unless otherwise noted    ALLERGIES & MEDICATIONS  --------------------------------------------------------------------------------  Allergies    lidocaine (Other)  predniSONE (Nephrotoxicity)    Intolerances      Standing Inpatient Medications  ALBUTerol/ipratropium for Nebulization 3 milliLiter(s) Nebulizer every 6 hours  aspirin  chewable 81 milliGRAM(s) Oral daily  atorvastatin 80 milliGRAM(s) Oral at bedtime  carvedilol 6.25 milliGRAM(s) Oral every 12 hours  clopidogrel Tablet 300 milliGRAM(s) Oral once  DULoxetine 30 milliGRAM(s) Oral daily  furosemide    Tablet 20 milliGRAM(s) Oral two times a day  gabapentin 300 milliGRAM(s) Oral <User Schedule>  influenza   Vaccine 0.5 milliLiter(s) IntraMuscular once  nortriptyline 10 milliGRAM(s) Oral at bedtime  sodium chloride 0.9%. 1000 milliLiter(s) IV Continuous <Continuous>    PRN Inpatient Medications  ALBUTerol    0.083% 2.5 milliGRAM(s) Nebulizer every 4 hours PRN  baclofen 10 milliGRAM(s) Oral three times a day PRN      REVIEW OF SYSTEMS  --------------------------------------------------------------------------------  Gen: No weight changes, fatigue, fevers/chills, weakness  Skin: No rashes  Head/Eyes/Ears/Mouth: No headache; Normal hearing; Normal vision w/o blurriness; No sinus pain/discomfort, sore throat  Respiratory: No dyspnea, cough, wheezing, hemoptysis  CV: No chest pain, PND, orthopnea  GI: No abdominal pain, diarrhea, constipation, nausea, vomiting, melena, hematochezia  : No increased frequency, dysuria, hematuria, nocturia  MSK: No joint pain/swelling; no back pain; no edema  Neuro: No dizziness/lightheadedness, weakness, seizures, numbness, tingling  Heme: No easy bruising or bleeding  Endo: No heat/cold intolerance  Psych: No significant nervousness, anxiety, stress, depression    All other systems were reviewed and are negative, except as noted.    VITALS/PHYSICAL EXAM  --------------------------------------------------------------------------------  T(C): 36.7 (02-08-19 @ 08:26), Max: 36.8 (02-08-19 @ 05:10)  HR: 70 (02-08-19 @ 13:40) (64 - 82)  BP: 167/71 (02-08-19 @ 13:40) (99/49 - 177/74)  RR: 14 (02-08-19 @ 13:40) (14 - 41)  SpO2: 96% (02-08-19 @ 13:40) (94% - 100%)  Wt(kg): --  Height (cm): 165.1 (02-06-19 @ 22:00)  Weight (kg): 84 (02-06-19 @ 22:00)  BMI (kg/m2): 30.8 (02-06-19 @ 22:00)  BSA (m2): 1.91 (02-06-19 @ 22:00)      02-07-19 @ 07:01  -  02-08-19 @ 07:00  --------------------------------------------------------  IN: 180 mL / OUT: 800 mL / NET: -620 mL      Physical Exam:    Constitutional: No fever, chills, NAD, Comfortable  Eyes: Not reddened, no discharge  ENMT: No discharge, No pain  Neck: No JVD, No Bruit  Back: No CVA tenderness  Respiratory: Clear to auscultation bilaterally  Cardiovascular: RRR, Normal S1-2, No S3-, No friction rub, systolic murmur 2/6 LSB  Gastrointestinal: Soft, NT/ND. BS+, No organomegaly  Extremities: No edema  Vascular: Distal pulses intact  Neurological: Alert, awake, oriented, able to move extremities, speech is clear  Skin: No ecchymosis, no rash  Musculoskeletal: Non tender, no weakness  Psychiatric: Appropriate mood, no anxiety    LABS/STUDIES  --------------------------------------------------------------------------------              8.8    20.8  >-----------<  225      [02-08-19 @ 06:07]              28.2     139  |  106  |  68.0  ----------------------------<  100      [02-08-19 @ 06:07]  3.8   |  22.0  |  2.34        Ca     8.8     [02-08-19 @ 06:07]      Mg     2.0     [02-08-19 @ 06:07]      Phos  3.5     [02-08-19 @ 06:07]    TPro  5.2  /  Alb  3.1  /  TBili  <0.2  /  DBili  x   /  AST  20  /  ALT  16  /  AlkPhos  102  [02-08-19 @ 06:07]    PT/INR: PT 12.1 , INR 1.05       [02-07-19 @ 01:07]  PTT: 153.5      [02-08-19 @ 01:30]    Troponin 0.76      [02-07-19 @ 17:05]        [02-07-19 @ 17:05]    Creatinine Trend:  SCr 2.34 [02-08 @ 06:07]  SCr 2.80 [02-07 @ 00:54]    Urinalysis - [02-08-19 @ 05:08]      Color Yellow / Appearance Slightly Turbid / SG 1.015 / pH 6.0      Gluc Negative / Ketone Negative  / Bili Negative / Urobili Negative       Blood Large / Protein 30 / Leuk Est Trace / Nitrite Negative      RBC >50 / WBC 6-10 / Hyaline  / Gran  / Sq Epi  / Non Sq Epi Occasional / Bacteria Few

## 2019-02-08 NOTE — DISCHARGE NOTE ADULT - PROVIDER TOKENS
FREE:[LAST:[seaa],PHONE:[(   )    -],FAX:[(   )    -],ADDRESS:[[facility]]],FREE:[LAST:[yordan],PHONE:[(   )    -],FAX:[(   )    -],ADDRESS:[[facility]]] FREE:[LAST:[yordan],PHONE:[(   )    -],FAX:[(   )    -],ADDRESS:[[facility]]],FREE:[LAST:[cortney],FIRST:[nesha],PHONE:[phone]],FAX:[(   )    -],ADDRESS:[[place] [address] [address]]]

## 2019-02-08 NOTE — PROGRESS NOTE ADULT - ASSESSMENT
> NSTEMI - 80% mid LAD lesion noted.  Awaiting Cath.  Continue ASA, Statin, BBlocker.  Awaiting Staged PCI Monday.  > Acute Hypoxic Respiratory Failure - now off BIPAP.  Monitor SaO2.  Supportive care.  > Acute Systolic CHF - improved after diuresis.  Monitor I&Os.  > Depression - continue Nortriptyline, Duloxetine.  > Leukocytosis - no indication of active infection.  Monitor CBC  > DVT prophylaxis - on Heparin

## 2019-02-08 NOTE — DISCHARGE NOTE ADULT - PATIENT PORTAL LINK FT
You can access the [facility] Patient Portal, offered by [facility], by registering with the following website: [email or web address]

## 2019-02-08 NOTE — DISCHARGE NOTE ADULT - PLAN OF CARE
Restricted use with no heavy lifting of affected arm for 48 hours.  No submerging the arm in water for 48 hours.  You may start showering today.  Call your doctor for any bleeding, swelling, loss of sensation in the hand or fingers, or fingers turning blue.  If heavy bleeding or large lumps form, hold pressure at the spot and come to the Emergency Room. Restricted use with no heavy lifting of affected arm for 48 hours.  No submerging the arm in water for 48 hours.  You may start showering today.    No heavy lifting, driving, sex, tub baths, swimming, or any activity that submerges the lower half of the body in water for 48 hours.  Limited walking and stairs for 48 hours.    Change the bandaid after 24 hours and every 24 hours after that.  Keep the puncture site dry and covered with a bandaid until a scab forms.    Observe the site frequently.  If bleeding or a large lump (the size of a golf ball or bigger) occurs lie flat, apply continuous direct pressure just above the puncture site for at least 10 minutes, and notify your physician immediately.  If the bleeding cannot be controlled, call 911 immediately for assistance.  Notify your physician of pain, swelling or any drainage.    Notify your physician immediately if coldness, numbness, discoloration or pain in your foot occurs.    Call your doctor for any bleeding, swelling, loss of sensation in the hand or fingers, or fingers turning blue.  If heavy bleeding or large lumps form, hold pressure at the spot and come to the Emergency Room. maintain optimum heart function

## 2019-02-08 NOTE — PROGRESS NOTE ADULT - ASSESSMENT
1) Elevated Cr  2) NSTEMI  3) HLD  4) CAD    Increase diuretics; lasix 20mg BID;   Agree with cardiology  Following renal fx; improved 2.8-->2.3  Watch for TEAGAN;  Maintain euvolemia    d/w Dr Feng this AM

## 2019-02-08 NOTE — DISCHARGE NOTE ADULT - MEDICATION SUMMARY - MEDICATIONS TO STOP TAKING
I will STOP taking the medications listed below when I get home from the hospital:    Cymbalta 30 mg oral delayed release capsule  -- 1 cap(s) by mouth once a day    verapamil 180 mg/12 hours oral tablet, extended release  -- 2 tab(s) by mouth in AM  1 tab by mouth PM

## 2019-02-08 NOTE — DISCHARGE NOTE ADULT - CARE PROVIDER_API CALL
cortney,   Cardiology  [place]  Phone: (   )    -  Fax: (   )    -  Follow Up Time:     quoc farr  [place]  Phone: (   )    -  Fax: (   )    -  Follow Up Time: quoc farr  [place]  Phone: (   )    -  Fax: (   )    -  Follow Up Time:     nesha barr  [facility]  [address]  [place]  Phone: [phone]  Fax: (   )    -  Follow Up Time:

## 2019-02-08 NOTE — DISCHARGE NOTE ADULT - NS AS ACTIVITY OBS
Showering allowed/Walking-Outdoors allowed/Walking-Indoors allowed/No Heavy lifting/straining/Do not drive or operate machinery

## 2019-02-08 NOTE — PROGRESS NOTE ADULT - SUBJECTIVE AND OBJECTIVE BOX
Patient: CHELLY HEATON [id number] 73y Female                           Internal Medicine Hospitalist Progress Note    Initial HPI:  74 y/o female with pmhx of CAD s/p RONNY in , SLE with nephropathy/CKD, depression, htn, breast and colon CA in remission since  who presented to [facility] with progressive shortness of breath, chest pain, wheezing and headache.  She was admitted to MICU in respiratory distress, placed on BIPAP and diuresed.  Transferred to [facility] to r/o tamponade.  Symptoms improved, pt weaned off BIPAP.  TTE showed EF 20-25%, trace pericardial effusion with no tamponade physiology appreciated. Transferred to Hospitalist Service.  Cath showed Mid LAD 80% stenosis, awaiting staged PCI Monday.    Interval History:  Pt seen with  at bedside. s/p cath.  No SOB.  No chest pain / palpitations.  No additional complaints.     ____________________PHYSICAL EXAM:  Vitals reviewed as indicated below  GENERAL:  NAD Alert and Oriented x 3   HEENT: NCAT  CARDIOVASCULAR:  S1, S2  LUNGS: CTAB  ABDOMEN:  soft, (-) tenderness, (-) distension, (+) bowel sounds, (-) guarding, (-) rebound (-) rigidity  EXTREMITIES:  no cyanosis / clubbing / edema.   ____________________    VITALS:  Vital Signs Last 24 Hrs  T(C): 36.7 (2019 16:13), Max: 36.8 (2019 05:10)  T(F): 98.1 (2019 16:13), Max: 98.2 (2019 05:10)  HR: 74 (2019 16:13) (64 - 82)  BP: 121/72 (2019 16:13) (99/49 - 177/74)  BP(mean): 80 (2019 23:00) (74 - 92)  RR: 18 (2019 16:13) (14 - 39)  SpO2: 94% (2019 16:13) (94% - 100%) Daily     Daily Weight in k.2 (2019 05:07)  CAPILLARY BLOOD GLUCOSE        I&O's Summary    2019 07:01  -  2019 07:00  --------------------------------------------------------  IN: 180 mL / OUT: 800 mL / NET: -620 mL        LABS:                        8.8    20.8  )-----------( 225      ( 2019 06:07 )             28.2     02-08    139  |  106  |  68.0<H>  ----------------------------<  100  3.8   |  22.0  |  2.34<H>    Ca    8.8      2019 06:07  Phos  3.5     02-08  Mg     2.0     02-08    TPro  5.2<L>  /  Alb  3.1<L>  /  TBili  <0.2<L>  /  DBili  x   /  AST  20  /  ALT  16  /  AlkPhos  102  02-08    PT/INR - ( 2019 01:07 )   PT: 12.1 sec;   INR: 1.05 ratio         PTT - ( 2019 01:30 )  PTT:153.5 sec  LIVER FUNCTIONS - ( 2019 06:07 )  Alb: 3.1 g/dL / Pro: 5.2 g/dL / ALK PHOS: 102 U/L / ALT: 16 U/L / AST: 20 U/L / GGT: x           Urinalysis Basic - ( 2019 05:08 )    Color: Yellow / Appearance: Slightly Turbid / S.015 / pH: x  Gluc: x / Ketone: Negative  / Bili: Negative / Urobili: Negative mg/dL   Blood: x / Protein: 30 mg/dL / Nitrite: Negative   Leuk Esterase: Trace / RBC: >50 /HPF / WBC 6-10   Sq Epi: x / Non Sq Epi: Occasional / Bacteria: Few      CARDIAC MARKERS ( 2019 17:05 )  x     / 0.76 ng/mL / 167 U/L / x     / 11.9 ng/mL  CARDIAC MARKERS ( 2019 07:34 )  x     / 0.64 ng/mL / 201 U/L / x     / 16.3 ng/mL  CARDIAC MARKERS ( 2019 00:54 )  x     / 0.60 ng/mL / 241 U/L / x     / 20.4 ng/mL        MEDICATIONS:  ALBUTerol    0.083% 2.5 milliGRAM(s) Nebulizer every 4 hours PRN  ALBUTerol/ipratropium for Nebulization 3 milliLiter(s) Nebulizer every 6 hours  aspirin  chewable 81 milliGRAM(s) Oral daily  atorvastatin 80 milliGRAM(s) Oral at bedtime  baclofen 10 milliGRAM(s) Oral three times a day PRN  carvedilol 6.25 milliGRAM(s) Oral every 12 hours  DULoxetine 30 milliGRAM(s) Oral daily  furosemide    Tablet 20 milliGRAM(s) Oral two times a day  gabapentin 300 milliGRAM(s) Oral <User Schedule>  influenza   Vaccine 0.5 milliLiter(s) IntraMuscular once  nortriptyline 10 milliGRAM(s) Oral at bedtime  sodium chloride 0.9%. 1000 milliLiter(s) IV Continuous <Continuous>

## 2019-02-08 NOTE — DISCHARGE NOTE ADULT - MEDICATION SUMMARY - MEDICATIONS TO TAKE
I will START or STAY ON the medications listed below when I get home from the hospital:    aspirin 81 mg oral tablet, chewable  -- 1 tab(s) by mouth once a day  -- Indication: For Cad    gabapentin 100 mg oral capsule  -- 2 cap(s) by mouth once a day  -- Indication: For Neuropathy    nortriptyline 10 mg oral capsule  -- 1 cap(s) by mouth once a day (at bedtime)  -- Indication: For Depression    atorvastatin 80 mg oral tablet  -- 1 tab(s) by mouth once a day (at bedtime)  -- Indication: For Neurpoathy    clopidogrel 75 mg oral tablet  -- 1 tab(s) by mouth once a day  -- Indication: For Cad    carvedilol 6.25 mg oral tablet  -- 1 tab(s) by mouth every 12 hours  -- Indication: For Cad    Breo Ellipta 100 mcg-25 mcg/inh inhalation powder  -- 1 puff(s) inhaled once a day  -- Indication: For Copd    Proventil HFA 90 mcg/inh inhalation aerosol  -- 2 puff(s) inhaled 4 times a day, As Needed  -- Indication: For Copd    furosemide 20 mg oral tablet  -- 1 tab(s) by mouth 2 times a day  -- Indication: For Chf    baclofen 10 mg oral tablet  -- 1 tab(s) by mouth 3 times a day  -- Indication: For Pain

## 2019-02-08 NOTE — PROGRESS NOTE ADULT - SUBJECTIVE AND OBJECTIVE BOX
[place] [facility]                                                        Office: [address] [address]                                                       Telephone: [phone]. Fax: [phone]      CC: Shortness of breath    INTERVAL HISTORY: Stable shortness of breath.     MEDICATIONS  (STANDING):  ALBUTerol/ipratropium for Nebulization 3 milliLiter(s) Nebulizer every 6 hours  aspirin  chewable 81 milliGRAM(s) Oral daily  atorvastatin 80 milliGRAM(s) Oral at bedtime  clopidogrel Tablet 300 milliGRAM(s) Oral once  DULoxetine 30 milliGRAM(s) Oral daily  furosemide    Tablet 20 milliGRAM(s) Oral two times a day  gabapentin 300 milliGRAM(s) Oral <User Schedule>  influenza   Vaccine 0.5 milliLiter(s) IntraMuscular once  metoprolol tartrate 25 milliGRAM(s) Oral two times a day  nortriptyline 10 milliGRAM(s) Oral at bedtime  sodium chloride 0.9%. 1000 milliLiter(s) (40 mL/Hr) IV Continuous <Continuous>    ROS: All others negative     PHYSICAL EXAM:  T(C): 36.7 (02-08-19 @ 08:26), Max: 36.8 (02-08-19 @ 05:10)  HR: 71 (02-08-19 @ 12:19) (64 - 82)  BP: 164/68 (02-08-19 @ 12:19) (99/49 - 164/68)  RR: 16 (02-08-19 @ 12:19) (15 - 41)  SpO2: 97% (02-08-19 @ 12:19) (95% - 100%)  Wt(kg): --  I&O's Summary    07 Feb 2019 07:01  -  08 Feb 2019 07:00  --------------------------------------------------------  IN: 180 mL / OUT: 800 mL / NET: -620 mL      Appearance: Normal	  HEENT:   Normal oral mucosa, PERRL, EOMI	  Lymphatic: No lymphadenopathy  Cardiovascular: Normal S1 S2, No JVD, No murmurs, No edema  Respiratory: Lungs clear to auscultation	  Psychiatry: A & O x 3, Mood & affect appropriate  Gastrointestinal:  Soft, Non-tender, + BS	  Skin: No rashes, No ecchymoses, No cyanosis  Neurologic: Non-focal  Extremities: Normal range of motion, No clubbing, cyanosis or edema  Vascular: Peripheral pulses palpable 2+ bilaterally       LABS:	 	                        8.8    20.8  )-----------( 225      ( 08 Feb 2019 06:07 )             28.2     02-08    139  |  106  |  68.0<H>  ----------------------------<  100  3.8   |  22.0  |  2.34<H>    Ca    8.8      08 Feb 2019 06:07  Phos  3.5     02-08  Mg     2.0     02-08    TPro  5.2<L>  /  Alb  3.1<L>  /  TBili  <0.2<L>  /  DBili  x   /  AST  20  /  ALT  16  /  AlkPhos  102  02-08

## 2019-02-09 LAB
HCT VFR BLD CALC: 28.2 % — LOW (ref 37–47)
HGB BLD-MCNC: 8.8 G/DL — LOW (ref 12–16)
MCHC RBC-ENTMCNC: 30.1 PG — SIGNIFICANT CHANGE UP (ref 27–31)
MCHC RBC-ENTMCNC: 31.2 G/DL — LOW (ref 32–36)
MCV RBC AUTO: 96.6 FL — SIGNIFICANT CHANGE UP (ref 81–99)
PLATELET # BLD AUTO: 225 K/UL — SIGNIFICANT CHANGE UP (ref 150–400)
RBC # BLD: 2.92 M/UL — LOW (ref 4.4–5.2)
RBC # FLD: 12.6 % — SIGNIFICANT CHANGE UP (ref 11–15.6)
WBC # BLD: 12.6 K/UL — HIGH (ref 4.8–10.8)
WBC # FLD AUTO: 12.6 K/UL — HIGH (ref 4.8–10.8)

## 2019-02-09 PROCEDURE — 99233 SBSQ HOSP IP/OBS HIGH 50: CPT

## 2019-02-09 RX ORDER — CEFTRIAXONE 500 MG/1
1 INJECTION, POWDER, FOR SOLUTION INTRAMUSCULAR; INTRAVENOUS EVERY 24 HOURS
Qty: 0 | Refills: 0 | Status: DISCONTINUED | OUTPATIENT
Start: 2019-02-10 | End: 2019-02-12

## 2019-02-09 RX ORDER — ACETAMINOPHEN 500 MG
650 TABLET ORAL EVERY 6 HOURS
Qty: 0 | Refills: 0 | Status: DISCONTINUED | OUTPATIENT
Start: 2019-02-09 | End: 2019-02-12

## 2019-02-09 RX ORDER — CEFTRIAXONE 500 MG/1
1 INJECTION, POWDER, FOR SOLUTION INTRAMUSCULAR; INTRAVENOUS ONCE
Qty: 0 | Refills: 0 | Status: COMPLETED | OUTPATIENT
Start: 2019-02-09 | End: 2019-02-09

## 2019-02-09 RX ORDER — CEFTRIAXONE 500 MG/1
INJECTION, POWDER, FOR SOLUTION INTRAMUSCULAR; INTRAVENOUS
Qty: 0 | Refills: 0 | Status: DISCONTINUED | OUTPATIENT
Start: 2019-02-09 | End: 2019-02-12

## 2019-02-09 RX ADMIN — Medication 3 MILLILITER(S): at 21:38

## 2019-02-09 RX ADMIN — CARVEDILOL PHOSPHATE 6.25 MILLIGRAM(S): 80 CAPSULE, EXTENDED RELEASE ORAL at 17:32

## 2019-02-09 RX ADMIN — GABAPENTIN 300 MILLIGRAM(S): 400 CAPSULE ORAL at 08:00

## 2019-02-09 RX ADMIN — ATORVASTATIN CALCIUM 80 MILLIGRAM(S): 80 TABLET, FILM COATED ORAL at 21:32

## 2019-02-09 RX ADMIN — Medication 3 MILLILITER(S): at 04:15

## 2019-02-09 RX ADMIN — Medication 81 MILLIGRAM(S): at 11:09

## 2019-02-09 RX ADMIN — GABAPENTIN 300 MILLIGRAM(S): 400 CAPSULE ORAL at 21:31

## 2019-02-09 RX ADMIN — DULOXETINE HYDROCHLORIDE 30 MILLIGRAM(S): 30 CAPSULE, DELAYED RELEASE ORAL at 11:09

## 2019-02-09 RX ADMIN — CEFTRIAXONE 100 GRAM(S): 500 INJECTION, POWDER, FOR SOLUTION INTRAMUSCULAR; INTRAVENOUS at 21:32

## 2019-02-09 RX ADMIN — NORTRIPTYLINE HYDROCHLORIDE 10 MILLIGRAM(S): 10 CAPSULE ORAL at 21:32

## 2019-02-09 RX ADMIN — Medication 20 MILLIGRAM(S): at 06:10

## 2019-02-09 RX ADMIN — Medication 20 MILLIGRAM(S): at 17:32

## 2019-02-09 RX ADMIN — Medication 3 MILLILITER(S): at 10:08

## 2019-02-09 RX ADMIN — Medication 3 MILLILITER(S): at 15:42

## 2019-02-09 RX ADMIN — CARVEDILOL PHOSPHATE 6.25 MILLIGRAM(S): 80 CAPSULE, EXTENDED RELEASE ORAL at 06:10

## 2019-02-09 RX ADMIN — CLOPIDOGREL BISULFATE 75 MILLIGRAM(S): 75 TABLET, FILM COATED ORAL at 11:09

## 2019-02-09 NOTE — PROGRESS NOTE ADULT - ASSESSMENT
72 y/o female with pmhx of CAD s/p RONNY in 2008, SLE with nephropathy/CKD, depression, htn, breast and colon CA in remission since 1998 who [place] [facility] [place]ness of breath, chest pain, wheezing and headache.  She was admitted to MICU in respiratory distress, placed on BIPAP and diuresed.  Transferred to [facility] to r/o tamponade.  Symptoms improved, pt weaned off BIPAP.  TTE showed EF 20-25%, trace pericardial effusion with no tamponade physiology appreciated. Transferred to Hospitalist Service.  Cath showed Mid LAD 80% stenosis, awaiting staged PCI Monday.     Problem/Plan - 1:  ·  Problem: Acute systolic right heart failure.  Plan: Ct lasix 20 mg po bid. .       Problem/Plan - 2:  ·  Problem: NSTEMI (non-ST elevated myocardial infarction).  Plan: Patient with mid LAD disease. PCI planned for Monday due to renal dysfunction.   Aspirin, plavix, statin. 72 y/o female with pmhx of CAD s/p RONNY in 2008, SLE with nephropathy/CKD, depression, htn, breast and colon CA in remission since 1998 who presented [place] [facility] with progressive shortness of breath, chest pain, wheezing and headache.  She was admitted to MICU in respiratory distress, placed on BIPAP and diuresed.  Transferred to [facility] to r/o tamponade.  Symptoms improved, pt weaned off BIPAP.  TTE showed EF 20-25%, trace pericardial effusion with no tamponade physiology appreciated. Transferred to Hospitalist Service.  Cath showed Mid LAD 80% stenosis, awaiting staged PCI Monday.     Problem/Plan - 1:  ·  Problem: Acute systolic right heart failure.  Plan: Ct lasix 20 mg po bid. .       Problem/Plan - 2:  ·  Problem: NSTEMI (non-ST elevated myocardial infarction).  Plan: Patient with mid LAD disease. PCI planned for Monday due to renal dysfunction.   Aspirin, plavix, bb, statin.

## 2019-02-09 NOTE — PROGRESS NOTE ADULT - SUBJECTIVE AND OBJECTIVE BOX
PROGRESS NOTE  Reason for follow up:    CAD , CKD                             Overnight: No new events.     Update:     Had LHC yesterday and which showed Mid LAD 80% stenosis,    Review of symptoms: Cardiac:  No chest pain. No dyspnea. No palpitations.  Respiratory: no cough. No dyspnea  Gastrointestinal: No diarrhea. No abdominal pain. No bleeding.      Chronic conditions:  HEALTH ISSUES -  Acute systolic right heart failure: Acute systolic right heart failure  HARDIK (acute kidney injury): HARDIK (acute kidney injury  Cardiomyopathy: Cardiomyopathy  NSTEMI (non-ST elevated myocardial infarction): NSTEMI (non-ST elevated myocardial infarction)  Coronary artery disease: Coronary artery disease  Pericardial effusion: Pericardial effusion    	  Vitals:  T(C): 36.8 (02-09-19 @ 15:56), Max: 37.1 (02-08-19 @ 21:48)  HR: 74 (02-09-19 @ 16:00) (73 - 89)  BP: 136/52 (02-09-19 @ 15:56) (110/44 - 136/52)  RR: 18 (02-09-19 @ 15:56) (17 - 18)  SpO2: 98% (02-09-19 @ 16:00) (93% - 99%)  I&O's Summary  08 Feb 2019 07:01  -  09 Feb 2019 07:00  --------------------------------------------------------  IN: 1000 mL / OUT: 0 mL / NET: 1000 mL  Weight (kg): 84 (02-06 @ 22:00)    PHYSICAL EXAM:  Appearance: Comfortable. No acute distress  HEENT:  Head and neck: Atraumatic. Normocephalic.  Normal oral mucosa, PERRL, Neck is supple. No JVD, No carotid bruit.   Neurologic: A & O x 3, no focal deficits. EOMI , Cranial nerves are intact.  Lymphatic: No cervical lymphadenopathy  Cardiovascular: Normal S1 S2, No murmur, rubs/gallops. No JVD, No edema  Respiratory: Lungs clear to auscultation  Gastrointestinal:  Soft, Non-tender, + BS  Lower Extremities: No edema  Psychiatry: Patient is calm. No agitation. Mood & affect appropriate  Skin: No rashes/ ecchymoses/cyanosis/ulcers visualized on the face, hands or feet.    CURRENT MEDICATIONS:  carvedilol 6.25 milliGRAM(s) Oral every 12 hours  furosemide    Tablet 20 milliGRAM(s) Oral two times a day  ALBUTerol/ipratropium for Nebulization  DULoxetine  gabapentin  nortriptyline  atorvastatin  aspirin  chewable  clopidogrel Tablet  influenza   Vaccine  sodium chloride 0.9%.    LABS:	 	  CARDIAC MARKERS ( 07 Feb 2019 17:05 )  x     / 0.76 ng/mL / 167 U/L / x     / 11.9 ng/mL  p-BNP 07 Feb 2019 17:05: 90820 pg/mL, CARDIAC MARKERS ( 07 Feb 2019 07:34 )  x     / 0.64 ng/mL / 201 U/L / x     / 16.3 ng/mL  p-BNP 07 Feb 2019 07:34: x    , CARDIAC MARKERS ( 07 Feb 2019 00:54 )  x     / 0.60 ng/mL / 241 U/L / x     / 20.4 ng/mL  p-BNP 07 Feb 2019 00:54: 46997 pg/mL                        8.8    12.6  )-----------( 225      ( 09 Feb 2019 06:21 )             28.2     02-08    139  |  106  |  68.0<H>  ----------------------------<  100  3.8   |  22.0  |  2.34<H>    Ca    8.8      08 Feb 2019 06:07  Phos  3.5     02-08  Mg     2.0     02-08  TPro  5.2<L>  /  Alb  3.1<L>  /  TBili  <0.2<L>  /  DBili  x   /  AST  20  /  ALT  16  /  AlkPhos  102  02-08  proBNP: Serum Pro-Brain Natriuretic Peptide: 96829 pg/mL (02-07 @ 17:05)  Serum Pro-Brain Natriuretic Peptide: 31558 pg/mL (02-07 @ 00:54)    TELEMETRY: Sr 70's, bb noted.        Assessment and Plan:       74 y/o female with pmhx of CAD s/p RONNY in 2008, SLE with nephropathy/CKD, depression, htn, breast and colon CA in remission since 1998 who presented to [place] with progressive shortness of breath, chest pain, wheezing and headache.  She was admitted to MICU in respiratory distress, placed on BIPAP and diuresed.  Transferred to [facility] to r/o tamponade.  Symptoms improved, pt weaned off BIPAP.  TTE showed EF 20-25%, trace pericardial effusion with no tamponade physiology appreciated. Transferred to Hospitalist Service.  Cath showed Mid LAD 80% stenosis, awaiting staged PCI Monday.     Problem/Plan - 1:  ·  Problem: Acute systolic right heart failure.  Plan: Ct lasix 20 mg po bid. .       Problem/Plan - 2:  ·  Problem: NSTEMI (non-ST elevated myocardial infarction).  Plan: Patient with mid LAD disease. PCI planned for Monday due to renal dysfunction.   Aspirin, plavix, bb, statin.     d/c IV fluids.

## 2019-02-09 NOTE — PROGRESS NOTE ADULT - SUBJECTIVE AND OBJECTIVE BOX
PMD:     CC:    Patient seen and examined at the bedside. No acute overnight events. - yesterday. Complaining of - this AM. Denies fever/chills, headache, lightheadedness, dizziness, chest pain, palpitations, shortness of breath, cough, abd pain, nausea/vomiting/diarrhea, muscle pain.      =========================================================================================  T(C): 36.8 (19 @ 15:56), Max: 37.1 (19 @ 21:48)  HR: 85 (19 @ 17:30) (73 - 89)  BP: 139/62 (19 @ 17:30) (110/44 - 139/62)  RR: 18 (19 @ 15:56) (17 - 18)  SpO2: 98% (19 @ 16:00) (93% - 99%)    PHYSICAL EXAM.    GEN - appears age appropriate. well nourished. pleasant. no distress.   HEENT - NCAT, EOMI, HORTENCIA  RESP - CTA BL, no wheeze/stridor/rhonchi/crackles. not on supplemental O2. able to speak in full sentences without distress.   CARDIO - NS1S2, RRR. No murmurs/rubs/gallops.  ABD - Soft/Non tender/Non distended. Normal BS x4 quadrants. no guarding/rebound tenderness.  Ext - No CASSI.  MSK - BL 5/5 strength on upper and lower extremities.   Neuro - AAOx3. cn 2-12 grossly intact  Psych - normal affect  Skin - c/d/i. no rashes/lesions      I&O's Summary    2019 07:01  -  2019 07:00  --------------------------------------------------------  IN: 1000 mL / OUT: 0 mL / NET: 1000 mL    2019 07:01  -  2019 19:48  --------------------------------------------------------  IN: 1350 mL / OUT: 0 mL / NET: 1350 mL      Daily     Daily Weight in k.6 (2019 05:14)    =========================================================================================  LABS.    08    139  |  106  |  68.0<H>  ----------------------------<  100  3.8   |  22.0  |  2.34<H>    Ca    8.8      2019 06:07  Phos  3.5     02-08  Mg     2.0     02-08    TPro  5.2<L>  /  Alb  3.1<L>  /  TBili  <0.2<L>  /  DBili  x   /  AST  20  /  ALT  16  /  AlkPhos  102  02-08                          8.8    12.6  )-----------( 225      ( 2019 06:21 )             28.2     LIVER FUNCTIONS - ( 2019 06:07 )  Alb: 3.1 g/dL / Pro: 5.2 g/dL / ALK PHOS: 102 U/L / ALT: 16 U/L / AST: 20 U/L / GGT: x           PTT - ( 2019 01:30 )  PTT:153.5 sec      Urinalysis Basic - ( 2019 05:08 )    Color: Yellow / Appearance: Slightly Turbid / S.015 / pH: x  Gluc: x / Ketone: Negative  / Bili: Negative / Urobili: Negative mg/dL   Blood: x / Protein: 30 mg/dL / Nitrite: Negative   Leuk Esterase: Trace / RBC: >50 /HPF / WBC 6-10   Sq Epi: x / Non Sq Epi: Occasional / Bacteria: Few          =========================================================================================  IMAGING.     =========================================================================================    HOME MEDS.    Home Medications:  baclofen 10 mg oral tablet: 1 tab(s) orally 3 times a day (:)  Breo Ellipta 100 mcg-25 mcg/inh inhalation powder: 1 puff(s) inhaled once a day (:)  Cymbalta 30 mg oral delayed release capsule: 1 cap(s) orally once a day (:)  gabapentin 300 mg oral capsule: 1 cap(s) orally in AM  3 caps at bedtime (:)  Lipitor 20 mg oral tablet: 1 tab(s) orally once a day (:)  nortriptyline 10 mg oral capsule: 1 cap(s) orally once a day (at bedtime) (:)  Proventil HFA 90 mcg/inh inhalation aerosol: 2 puff(s) inhaled 4 times a day, As Needed (:)  verapamil 180 mg/12 hours oral tablet, extended release: 2 tab(s) orally in AM  1 tab orally PM (:)      =========================================================================================    HOSPITAL MEDS.    MEDICATIONS  (STANDING):  ALBUTerol/ipratropium for Nebulization 3 milliLiter(s) Nebulizer every 6 hours  aspirin  chewable 81 milliGRAM(s) Oral daily  atorvastatin 80 milliGRAM(s) Oral at bedtime  carvedilol 6.25 milliGRAM(s) Oral every 12 hours  cefTRIAXone   IVPB      cefTRIAXone   IVPB 1 Gram(s) IV Intermittent once  clopidogrel Tablet 75 milliGRAM(s) Oral daily  DULoxetine 30 milliGRAM(s) Oral daily  furosemide    Tablet 20 milliGRAM(s) Oral two times a day  gabapentin 300 milliGRAM(s) Oral <User Schedule>  influenza   Vaccine 0.5 milliLiter(s) IntraMuscular once  nortriptyline 10 milliGRAM(s) Oral at bedtime    MEDICATIONS  (PRN):  ALBUTerol    0.083% 2.5 milliGRAM(s) Nebulizer every 4 hours PRN Wheezing  baclofen 10 milliGRAM(s) Oral three times a day PRN Muscle Spasm CC: back pain    Patient seen and examined at the bedside. No acute overnight events. no events yesterday. Complaining of sudden onset mild LT lower back pain this AM. Denies fever/chills, headache, lightheadedness, dizziness, chest pain, palpitations, shortness of breath, cough, abd pain, nausea/vomiting/diarrhea,      =========================================================================================  T(C): 36.8 (19 @ 15:56), Max: 37.1 (19 @ 21:48)  HR: 85 (19 @ 17:30) (73 - 89)  BP: 139/62 (19 @ 17:30) (110/44 - 139/62)  RR: 18 (19 @ 15:56) (17 - 18)  SpO2: 98% (19 @ 16:00) (93% - 99%)    PHYSICAL EXAM.    GEN - appears age appropriate. well nourished. pleasant. no distress.   HEENT - NCAT, EOMI, HORTENCIA  RESP - CTA BL, no wheeze/stridor/rhonchi/crackles. not on supplemental O2. able to speak in full sentences without distress.   CARDIO - NS1S2, RRR. No murmurs/rubs/gallops.  ABD - Soft/Non tender/Non distended. Normal BS x4 quadrants. no guarding/rebound tenderness.  Ext - No CASSI.  MSK - BL 5/5 strength on upper and lower extremities.   Neuro - AAOx3. cn 2-12 grossly intact  Psych - normal affect  Skin - c/d/i. no rashes/lesions      I&O's Summary    2019 07:01  -  2019 07:00  --------------------------------------------------------  IN: 1000 mL / OUT: 0 mL / NET: 1000 mL    2019 07:01  -  2019 19:48  --------------------------------------------------------  IN: 1350 mL / OUT: 0 mL / NET: 1350 mL      Daily     Daily Weight in k.6 (2019 05:14)    =========================================================================================  LABS.    08    139  |  106  |  68.0<H>  ----------------------------<  100  3.8   |  22.0  |  2.34<H>    Ca    8.8      2019 06:07  Phos  3.5     02-08  Mg     2.0     02-08    TPro  5.2<L>  /  Alb  3.1<L>  /  TBili  <0.2<L>  /  DBili  x   /  AST  20  /  ALT  16  /  AlkPhos  102  02-08                          8.8    12.6  )-----------( 225      ( 2019 06:21 )             28.2     LIVER FUNCTIONS - ( 2019 06:07 )  Alb: 3.1 g/dL / Pro: 5.2 g/dL / ALK PHOS: 102 U/L / ALT: 16 U/L / AST: 20 U/L / GGT: x           PTT - ( 2019 01:30 )  PTT:153.5 sec      Urinalysis Basic - ( 2019 05:08 )    Color: Yellow / Appearance: Slightly Turbid / S.015 / pH: x  Gluc: x / Ketone: Negative  / Bili: Negative / Urobili: Negative mg/dL   Blood: x / Protein: 30 mg/dL / Nitrite: Negative   Leuk Esterase: Trace / RBC: >50 /HPF / WBC 6-10   Sq Epi: x / Non Sq Epi: Occasional / Bacteria: Few          =========================================================================================  IMAGING.     =========================================================================================    HOME MEDS.    Home Medications:  baclofen 10 mg oral tablet: 1 tab(s) orally 3 times a day (:28)  Breo Ellipta 100 mcg-25 mcg/inh inhalation powder: 1 puff(s) inhaled once a day (:)  Cymbalta 30 mg oral delayed release capsule: 1 cap(s) orally once a day (:)  gabapentin 300 mg oral capsule: 1 cap(s) orally in AM  3 caps at bedtime (:)  Lipitor 20 mg oral tablet: 1 tab(s) orally once a day (:)  nortriptyline 10 mg oral capsule: 1 cap(s) orally once a day (at bedtime) (:)  Proventil HFA 90 mcg/inh inhalation aerosol: 2 puff(s) inhaled 4 times a day, As Needed (:)  verapamil 180 mg/12 hours oral tablet, extended release: 2 tab(s) orally in AM  1 tab orally PM (:)      =========================================================================================    HOSPITAL MEDS.    MEDICATIONS  (STANDING):  ALBUTerol/ipratropium for Nebulization 3 milliLiter(s) Nebulizer every 6 hours  aspirin  chewable 81 milliGRAM(s) Oral daily  atorvastatin 80 milliGRAM(s) Oral at bedtime  carvedilol 6.25 milliGRAM(s) Oral every 12 hours  cefTRIAXone   IVPB      cefTRIAXone   IVPB 1 Gram(s) IV Intermittent once  clopidogrel Tablet 75 milliGRAM(s) Oral daily  DULoxetine 30 milliGRAM(s) Oral daily  furosemide    Tablet 20 milliGRAM(s) Oral two times a day  gabapentin 300 milliGRAM(s) Oral <User Schedule>  influenza   Vaccine 0.5 milliLiter(s) IntraMuscular once  nortriptyline 10 milliGRAM(s) Oral at bedtime    MEDICATIONS  (PRN):  ALBUTerol    0.083% 2.5 milliGRAM(s) Nebulizer every 4 hours PRN Wheezing  baclofen 10 milliGRAM(s) Oral three times a day PRN Muscle Spasm

## 2019-02-09 NOTE — PROGRESS NOTE ADULT - ASSESSMENT
> NSTEMI - 80% mid LAD lesion noted.  Awaiting Cath.  Continue ASA, Statin, BBlocker.  Awaiting Staged PCI Monday.  > Acute Hypoxic Respiratory Failure - now off BIPAP.  Monitor SaO2.  Supportive care.  > Acute Systolic CHF - improved after diuresis.  Monitor I&Os.  > Depression - continue Nortriptyline, Duloxetine.  > Leukocytosis - no indication of active infection.  Monitor CBC  > DVT prophylaxis - on Heparin 72 y/o female with pmhx of CAD s/p RONNY in 2008, SLE with nephropathy/CKD, depression, htn, breast and colon CA in remission since 1998 who presented to [facility] [place] with progressive shortness of breath, chest pain, wheezing and headache, transferred for management of HF exac complicated by resp failure, admitted directly to icu.     downgraded to med floors.    presentation complicated by nstemi.     > NSTEMI - 80% mid LAD lesion noted.  Awaiting Cath, not done @ time of presentation due to renal function.  Continue ASA, Statin, BBlocker.  Awaiting Staged PCI Monday  >UTI - noted to have abn ua from 2/8, complicated by urinary freq. UC ordered. start rocephin x3d, can change regimen based on cx results  > Acute Hypoxic Respiratory Failure - resolved. sec to pulm edema in setting of hf exac. now off BIPAP.  Monitor SaO2.  Supportive care.  > Acute Systolic CHF - icomplicated by pericardial effusion + resp failure. mproved after diuresis.  Monitor I&Os. exac resolved  > Depression - continue Nortriptyline, Duloxetine. stable, uncomplicated  > DVT prophylaxis - on Heparin

## 2019-02-09 NOTE — PROGRESS NOTE ADULT - SUBJECTIVE AND OBJECTIVE BOX
[facility]                                                       [facility]                                                        Office: [address] [address]                                                       Telephone: [phone]. Fax:[phone]                                                                             PROGRESS NOTE   Reason for follow up:    CAD/ possible pericardiocentesis                           Overnight: No new events.   Update:   Had LHC yesterday and which showed Mid LAD 80% stenosis,  Subjective: "  Hello"   Complains of: Frequent soft bm    Review of symptoms: Cardiac:  No chest pain. No dyspnea. No palpitations.  Respiratory: no cough. No dyspnea  Gastrointestinal: No diarrhea. No abdominal pain. No bleeding.     Past medical history: No updates.   Chronic conditions:  HEALTH ISSUES -  Acute systolic right heart failure: Acute systolic right heart failure  HARDIK (acute kidney injury): HARDIK (acute kidney injury  Cardiomyopathy: Cardiomyopathy  NSTEMI (non-ST elevated myocardial infarction): NSTEMI (non-ST elevated myocardial infarction)  Coronary artery disease: Coronary artery disease  Pericardial effusion: Pericardial effusion    	  Vitals:  T(C): 36.8 (02-09-19 @ 15:56), Max: 37.1 (02-08-19 @ 21:48)  HR: 74 (02-09-19 @ 16:00) (73 - 89)  BP: 136/52 (02-09-19 @ 15:56) (110/44 - 136/52)  RR: 18 (02-09-19 @ 15:56) (17 - 18)  SpO2: 98% (02-09-19 @ 16:00) (93% - 99%)  I&O's Summary  08 Feb 2019 07:01  -  09 Feb 2019 07:00  --------------------------------------------------------  IN: 1000 mL / OUT: 0 mL / NET: 1000 mL  Weight (kg): 84 (02-06 @ 22:00)    PHYSICAL EXAM:  Appearance: Comfortable. No acute distress  HEENT:  Head and neck: Atraumatic. Normocephalic.  Normal oral mucosa, PERRL, Neck is supple. No JVD, No carotid bruit.   Neurologic: A & O x 3, no focal deficits. EOMI , Cranial nerves are intact.  Lymphatic: No cervical lymphadenopathy  Cardiovascular: Normal S1 S2, No murmur, rubs/gallops. No JVD, No edema  Respiratory: Lungs clear to auscultation  Gastrointestinal:  Soft, Non-tender, + BS  Lower Extremities: No edema  Psychiatry: Patient is calm. No agitation. Mood & affect appropriate  Skin: No rashes/ ecchymoses/cyanosis/ulcers visualized on the face, hands or feet.    CURRENT MEDICATIONS:  carvedilol 6.25 milliGRAM(s) Oral every 12 hours  furosemide    Tablet 20 milliGRAM(s) Oral two times a day  ALBUTerol/ipratropium for Nebulization  DULoxetine  gabapentin  nortriptyline  atorvastatin  aspirin  chewable  clopidogrel Tablet  influenza   Vaccine  sodium chloride 0.9%.    LABS:	 	  CARDIAC MARKERS ( 07 Feb 2019 17:05 )  x     / 0.76 ng/mL / 167 U/L / x     / 11.9 ng/mL  p-BNP 07 Feb 2019 17:05: 64915 pg/mL, CARDIAC MARKERS ( 07 Feb 2019 07:34 )  x     / 0.64 ng/mL / 201 U/L / x     / 16.3 ng/mL  p-BNP 07 Feb 2019 07:34: x    , CARDIAC MARKERS ( 07 Feb 2019 00:54 )  x     / 0.60 ng/mL / 241 U/L / x     / 20.4 ng/mL  p-BNP 07 Feb 2019 00:54: 17369 pg/mL                        8.8    12.6  )-----------( 225      ( 09 Feb 2019 06:21 )             28.2     02-08    139  |  106  |  68.0<H>  ----------------------------<  100  3.8   |  22.0  |  2.34<H>    Ca    8.8      08 Feb 2019 06:07  Phos  3.5     02-08  Mg     2.0     02-08  TPro  5.2<L>  /  Alb  3.1<L>  /  TBili  <0.2<L>  /  DBili  x   /  AST  20  /  ALT  16  /  AlkPhos  102  02-08  proBNP: Serum Pro-Brain Natriuretic Peptide: 96197 pg/mL (02-07 @ 17:05)  Serum Pro-Brain Natriuretic Peptide: 36748 pg/mL (02-07 @ 00:54)    TELEMETRY: Sr 70's, bb noted. [place] [facility]                                                        Office: [address] [address]                                                       Telephone: [phone]. Fax:[phone]                                                                             PROGRESS NOTE   Reason for follow up:    CAD                           Overnight: No new events.   Update:   Had LHC yesterday and which showed Mid LAD 80% stenosis,  Subjective: "  Hello"   Complains of: Frequent soft bm    Review of symptoms: Cardiac:  No chest pain. No dyspnea. No palpitations.  Respiratory: no cough. No dyspnea  Gastrointestinal: No diarrhea. No abdominal pain. No bleeding.     Past medical history: No updates.   Chronic conditions:  HEALTH ISSUES -  Acute systolic right heart failure: Acute systolic right heart failure  HARDIK (acute kidney injury): HARDIK (acute kidney injury  Cardiomyopathy: Cardiomyopathy  NSTEMI (non-ST elevated myocardial infarction): NSTEMI (non-ST elevated myocardial infarction)  Coronary artery disease: Coronary artery disease  Pericardial effusion: Pericardial effusion    	  Vitals:  T(C): 36.8 (02-09-19 @ 15:56), Max: 37.1 (02-08-19 @ 21:48)  HR: 74 (02-09-19 @ 16:00) (73 - 89)  BP: 136/52 (02-09-19 @ 15:56) (110/44 - 136/52)  RR: 18 (02-09-19 @ 15:56) (17 - 18)  SpO2: 98% (02-09-19 @ 16:00) (93% - 99%)  I&O's Summary  08 Feb 2019 07:01  -  09 Feb 2019 07:00  --------------------------------------------------------  IN: 1000 mL / OUT: 0 mL / NET: 1000 mL  Weight (kg): 84 (02-06 @ 22:00)    PHYSICAL EXAM:  Appearance: Comfortable. No acute distress  HEENT:  Head and neck: Atraumatic. Normocephalic.  Normal oral mucosa, PERRL, Neck is supple. No JVD, No carotid bruit.   Neurologic: A & O x 3, no focal deficits. EOMI , Cranial nerves are intact.  Lymphatic: No cervical lymphadenopathy  Cardiovascular: Normal S1 S2, No murmur, rubs/gallops. No JVD, No edema  Respiratory: Lungs clear to auscultation  Gastrointestinal:  Soft, Non-tender, + BS  Lower Extremities: No edema  Psychiatry: Patient is calm. No agitation. Mood & affect appropriate  Skin: No rashes/ ecchymoses/cyanosis/ulcers visualized on the face, hands or feet.    CURRENT MEDICATIONS:  carvedilol 6.25 milliGRAM(s) Oral every 12 hours  furosemide    Tablet 20 milliGRAM(s) Oral two times a day  ALBUTerol/ipratropium for Nebulization  DULoxetine  gabapentin  nortriptyline  atorvastatin  aspirin  chewable  clopidogrel Tablet  influenza   Vaccine  sodium chloride 0.9%.    LABS:	 	  CARDIAC MARKERS ( 07 Feb 2019 17:05 )  x     / 0.76 ng/mL / 167 U/L / x     / 11.9 ng/mL  p-BNP 07 Feb 2019 17:05: 53291 pg/mL, CARDIAC MARKERS ( 07 Feb 2019 07:34 )  x     / 0.64 ng/mL / 201 U/L / x     / 16.3 ng/mL  p-BNP 07 Feb 2019 07:34: x    , CARDIAC MARKERS ( 07 Feb 2019 00:54 )  x     / 0.60 ng/mL / 241 U/L / x     / 20.4 ng/mL  p-BNP 07 Feb 2019 00:54: 08037 pg/mL                        8.8    12.6  )-----------( 225      ( 09 Feb 2019 06:21 )             28.2     02-08    139  |  106  |  68.0<H>  ----------------------------<  100  3.8   |  22.0  |  2.34<H>    Ca    8.8      08 Feb 2019 06:07  Phos  3.5     02-08  Mg     2.0     02-08  TPro  5.2<L>  /  Alb  3.1<L>  /  TBili  <0.2<L>  /  DBili  x   /  AST  20  /  ALT  16  /  AlkPhos  102  02-08  proBNP: Serum Pro-Brain Natriuretic Peptide: 65588 pg/mL (02-07 @ 17:05)  Serum Pro-Brain Natriuretic Peptide: 31855 pg/mL (02-07 @ 00:54)    TELEMETRY: Sr 70's, bb noted.

## 2019-02-10 LAB
CREAT ?TM UR-MCNC: 55 MG/DL — SIGNIFICANT CHANGE UP
HCT VFR BLD CALC: 30.7 % — LOW (ref 37–47)
HGB BLD-MCNC: 9.8 G/DL — LOW (ref 12–16)
MCHC RBC-ENTMCNC: 30.5 PG — SIGNIFICANT CHANGE UP (ref 27–31)
MCHC RBC-ENTMCNC: 31.9 G/DL — LOW (ref 32–36)
MCV RBC AUTO: 95.6 FL — SIGNIFICANT CHANGE UP (ref 81–99)
PLATELET # BLD AUTO: 244 K/UL — SIGNIFICANT CHANGE UP (ref 150–400)
POTASSIUM UR-SCNC: 13 MMOL/L — SIGNIFICANT CHANGE UP
PROT ?TM UR-MCNC: 27 MG/DL — HIGH (ref 0–12)
PROT/CREAT UR-RTO: 0.5 RATIO — HIGH
RBC # BLD: 3.21 M/UL — LOW (ref 4.4–5.2)
RBC # FLD: 12.6 % — SIGNIFICANT CHANGE UP (ref 11–15.6)
SODIUM UR-SCNC: 98 MMOL/L — SIGNIFICANT CHANGE UP
WBC # BLD: 10.5 K/UL — SIGNIFICANT CHANGE UP (ref 4.8–10.8)
WBC # FLD AUTO: 10.5 K/UL — SIGNIFICANT CHANGE UP (ref 4.8–10.8)

## 2019-02-10 PROCEDURE — 99233 SBSQ HOSP IP/OBS HIGH 50: CPT

## 2019-02-10 RX ORDER — IPRATROPIUM/ALBUTEROL SULFATE 18-103MCG
3 AEROSOL WITH ADAPTER (GRAM) INHALATION EVERY 6 HOURS
Qty: 0 | Refills: 0 | Status: DISCONTINUED | OUTPATIENT
Start: 2019-02-10 | End: 2019-02-12

## 2019-02-10 RX ADMIN — ATORVASTATIN CALCIUM 80 MILLIGRAM(S): 80 TABLET, FILM COATED ORAL at 20:35

## 2019-02-10 RX ADMIN — NORTRIPTYLINE HYDROCHLORIDE 10 MILLIGRAM(S): 10 CAPSULE ORAL at 20:35

## 2019-02-10 RX ADMIN — Medication 3 MILLILITER(S): at 09:25

## 2019-02-10 RX ADMIN — Medication 20 MILLIGRAM(S): at 17:07

## 2019-02-10 RX ADMIN — CARVEDILOL PHOSPHATE 6.25 MILLIGRAM(S): 80 CAPSULE, EXTENDED RELEASE ORAL at 05:42

## 2019-02-10 RX ADMIN — Medication 20 MILLIGRAM(S): at 05:42

## 2019-02-10 RX ADMIN — DULOXETINE HYDROCHLORIDE 30 MILLIGRAM(S): 30 CAPSULE, DELAYED RELEASE ORAL at 11:58

## 2019-02-10 RX ADMIN — Medication 81 MILLIGRAM(S): at 11:58

## 2019-02-10 RX ADMIN — CARVEDILOL PHOSPHATE 6.25 MILLIGRAM(S): 80 CAPSULE, EXTENDED RELEASE ORAL at 17:06

## 2019-02-10 RX ADMIN — CEFTRIAXONE 100 GRAM(S): 500 INJECTION, POWDER, FOR SOLUTION INTRAMUSCULAR; INTRAVENOUS at 17:34

## 2019-02-10 RX ADMIN — GABAPENTIN 300 MILLIGRAM(S): 400 CAPSULE ORAL at 08:19

## 2019-02-10 RX ADMIN — GABAPENTIN 300 MILLIGRAM(S): 400 CAPSULE ORAL at 20:35

## 2019-02-10 RX ADMIN — CLOPIDOGREL BISULFATE 75 MILLIGRAM(S): 75 TABLET, FILM COATED ORAL at 11:58

## 2019-02-10 NOTE — PROGRESS NOTE ADULT - SUBJECTIVE AND OBJECTIVE BOX
CC: back pain    Patient seen and examined at the bedside. No acute overnight events. No complaints this AM. Denies fever/chills, headache, lightheadedness, dizziness, chest pain, palpitations, shortness of breath, cough, abd pain, nausea/vomiting/diarrhea,    =========================================================================================  T(C): 36.8 (19 @ 15:56), Max: 37.1 (19 @ 21:48)  HR: 85 (19 @ 17:30) (73 - 89)  BP: 139/62 (19 @ 17:30) (110/44 - 139/62)  RR: 18 (19 @ 15:56) (17 - 18)  SpO2: 98% (19 @ 16:00) (93% - 99%)    PHYSICAL EXAM.    GEN - appears age appropriate. In no distress. Obese, Pale,  HEENT - NCAT, EOMI, HORTENCIA  RESP - CTA Bilaterally,  no wheeze/stridor/rhonchi/crackles. not on supplemental O2.    CARDIO - S1S2, RRR. No murmurs/rubs/gallops.  ABD - Soft/Non tender/Non distended. Normal BS x4 quadrants.  No guarding/rebound tenderness.  Ext - No CASSI.  MSK - BL 5/5 strength on upper and lower extremities.   Neuro - AAOx3. CN  2-12 grossly intact  Psych - normal affect  Skin - No rashes/lesions    I&O's Summary    2019 07:  -  2019 07:00  --------------------------------------------------------  IN: 1000 mL / OUT: 0 mL / NET: 1000 mL    2019 07:01  -  2019 19:48  --------------------------------------------------------  IN: 1350 mL / OUT: 0 mL / NET: 1350 mL    Daily Weight in k.6 (2019 05:14)    =========================================================================================  LABS.    08    139  |  106  |  68.0<H>  ----------------------------<  100  3.8   |  22.0  |  2.34<H>    Ca    8.8      2019 06:07  Phos  3.5     02-08  Mg     2.0     02-08    TPro  5.2<L>  /  Alb  3.1<L>  /  TBili  <0.2<L>  /  DBili  x   /  AST  20  /  ALT  16  /  AlkPhos  102  02-08                        8.8    12.6  )-----------( 225      ( 2019 06:21 )             28.2     LIVER FUNCTIONS - ( 2019 06:07 )  Alb: 3.1 g/dL / Pro: 5.2 g/dL / ALK PHOS: 102 U/L / ALT: 16 U/L / AST: 20 U/L / GGT: x           PTT - ( 2019 01:30 )  PTT:153.5 sec    Urinalysis Basic - ( 2019 05:08 )    Color: Yellow / Appearance: Slightly Turbid / S.015 / pH: x  Gluc: x / Ketone: Negative  / Bili: Negative / Urobili: Negative mg/dL   Blood: x / Protein: 30 mg/dL / Nitrite: Negative   Leuk Esterase: Trace / RBC: >50 /HPF / WBC 6-10   Sq Epi: x / Non Sq Epi: Occasional / Bacteria: Few  =========================================================================================  Home Medications:  baclofen 10 mg oral tablet: 1 tab(s) orally 3 times a day (2019 14:28)  Breo Ellipta 100 mcg-25 mcg/inh inhalation powder: 1 puff(s) inhaled once a day (2019 14:28)  Cymbalta 30 mg oral delayed release capsule: 1 cap(s) orally once a day (2019 14:28)  gabapentin 300 mg oral capsule: 1 cap(s) orally in AM  3 caps at bedtime (2019 14:28)  Lipitor 20 mg oral tablet: 1 tab(s) orally once a day (2019 14:28)  nortriptyline 10 mg oral capsule: 1 cap(s) orally once a day (at bedtime) (2019 14:28)  Proventil HFA 90 mcg/inh inhalation aerosol: 2 puff(s) inhaled 4 times a day, As Needed (2019 14:28)  verapamil 180 mg/12 hours oral tablet, extended release: 2 tab(s) orally in AM  1 tab orally PM (2019 14:28)  =========================================================================================    MEDICATIONS  (STANDING):  ALBUTerol/ipratropium for Nebulization 3 milliLiter(s) Nebulizer every 6 hours  aspirin  chewable 81 milliGRAM(s) Oral daily  atorvastatin 80 milliGRAM(s) Oral at bedtime  carvedilol 6.25 milliGRAM(s) Oral every 12 hours  cefTRIAXone   IVPB      cefTRIAXone   IVPB 1 Gram(s) IV Intermittent once  clopidogrel Tablet 75 milliGRAM(s) Oral daily  DULoxetine 30 milliGRAM(s) Oral daily  furosemide    Tablet 20 milliGRAM(s) Oral two times a day  gabapentin 300 milliGRAM(s) Oral <User Schedule>  influenza   Vaccine 0.5 milliLiter(s) IntraMuscular once  nortriptyline 10 milliGRAM(s) Oral at bedtime    Assessment and Plan:     74 y/o female with CAD s/p RONNY in , SLE GN /CKD- 4 , HTN, breast and colon CA in remission since  who presented to [facility] [place] with progressive shortness of breath, chest pain, wheezing and headache, transferred for management of HF exacerbation  complicated by respiratory  failure, admitted directly to icu.     presentation complicated by NSTEMI,    > NSTEMI - 80% mid LAD lesion noted.  On ASA, Statin, BB, Staged PCI in AM,  >UTI - On Antibiotics,  > Acute Hypoxic Respiratory Failure - resolved.   > Acute Systolic CHF/ CMP - complicated  by pericardial effusion - On Diuresis, Echo with EF 20%.      Problem/Plan - 1:  ·  Problem: NSTEMI (non-ST elevated myocardial infarction).     Problem/Plan - 2:  ·  Problem: HARDIK (acute kidney injury).     Problem/Plan - 3:  ·  Problem: Acute respiratory failure with hypoxia.       PCI in AM,    Normal saline 0.5 ml/kg/hour Pre - PCI,    Lasix IV PRN, if PCWP Elevated during PCI,    BRAYDEN Villalobos RN,    Plans per ,

## 2019-02-10 NOTE — PROGRESS NOTE ADULT - ASSESSMENT
72 y/o female with pmhx of CAD s/p RONNY in 2008, SLE with nephropathy/CKD, depression, htn, breast and colon CA in remission since 1998 who presented to [facility] [place] with progressive shortness of breath, chest pain, wheezing and headache.  She was admitted to MICU in respiratory distress, placed on BIPAP and diuresed.  Transferred to [facility] to r/o tamponade.  Symptoms improved, pt weaned off BIPAP.  TTE showed EF 20-25%, trace pericardial effusion with no tamponade physiology appreciated. Transferred to Hospitalist Service.  Cath showed Mid LAD 80% stenosis, awaiting staged PCI Monday.     Problem/Plan - 1:  ·  Problem: Acute systolic right heart failure.  Plan: Ct lasix 20 mg po bid. .       Problem/Plan - 2:  ·  Problem: NSTEMI (non-ST elevated myocardial infarction).  Plan: Patient with mid LAD disease. PCI planned for Monday due to renal dysfunction.   f/u BMP tomorrow before cath .  Aspirin, plavix, bb, statin.

## 2019-02-10 NOTE — PROGRESS NOTE ADULT - SUBJECTIVE AND OBJECTIVE BOX
CC: back pain    Patient seen and examined at the bedside. No acute overnight events. no events yesterday. No complaints this AM. Denies fever/chills, headache, lightheadedness, dizziness, chest pain, palpitations, shortness of breath, cough, abd pain, nausea/vomiting/diarrhea,      =========================================================================================  T(C): 36.8 (19 @ 15:56), Max: 37.1 (19 @ 21:48)  HR: 85 (19 @ 17:30) (73 - 89)  BP: 139/62 (19 @ 17:30) (110/44 - 139/62)  RR: 18 (19 @ 15:56) (17 - 18)  SpO2: 98% (19 @ 16:00) (93% - 99%)    PHYSICAL EXAM.    GEN - appears age appropriate. well nourished. pleasant. no distress.   HEENT - NCAT, EOMI, HORTENCIA  RESP - CTA BL, no wheeze/stridor/rhonchi/crackles. not on supplemental O2. able to speak in full sentences without distress.   CARDIO - NS1S2, RRR. No murmurs/rubs/gallops.  ABD - Soft/Non tender/Non distended. Normal BS x4 quadrants. no guarding/rebound tenderness.  Ext - No CASSI.  MSK - BL 5/5 strength on upper and lower extremities.   Neuro - AAOx3. cn 2-12 grossly intact  Psych - normal affect  Skin - c/d/i. no rashes/lesions      I&O's Summary    2019 07:01  -  2019 07:00  --------------------------------------------------------  IN: 1000 mL / OUT: 0 mL / NET: 1000 mL    2019 07:01  -  2019 19:48  --------------------------------------------------------  IN: 1350 mL / OUT: 0 mL / NET: 1350 mL      Daily     Daily Weight in k.6 (2019 05:14)    =========================================================================================  LABS.    02-08    139  |  106  |  68.0<H>  ----------------------------<  100  3.8   |  22.0  |  2.34<H>    Ca    8.8      2019 06:07  Phos  3.5     02-08  Mg     2.0     02-08    TPro  5.2<L>  /  Alb  3.1<L>  /  TBili  <0.2<L>  /  DBili  x   /  AST  20  /  ALT  16  /  AlkPhos  102  02-08                          8.8    12.6  )-----------( 225      ( 2019 06:21 )             28.2     LIVER FUNCTIONS - ( 2019 06:07 )  Alb: 3.1 g/dL / Pro: 5.2 g/dL / ALK PHOS: 102 U/L / ALT: 16 U/L / AST: 20 U/L / GGT: x           PTT - ( 2019 01:30 )  PTT:153.5 sec      Urinalysis Basic - ( 2019 05:08 )    Color: Yellow / Appearance: Slightly Turbid / S.015 / pH: x  Gluc: x / Ketone: Negative  / Bili: Negative / Urobili: Negative mg/dL   Blood: x / Protein: 30 mg/dL / Nitrite: Negative   Leuk Esterase: Trace / RBC: >50 /HPF / WBC 6-10   Sq Epi: x / Non Sq Epi: Occasional / Bacteria: Few          =========================================================================================  IMAGING.     =========================================================================================    HOME MEDS.    Home Medications:  baclofen 10 mg oral tablet: 1 tab(s) orally 3 times a day (:)  Breo Ellipta 100 mcg-25 mcg/inh inhalation powder: 1 puff(s) inhaled once a day (:)  Cymbalta 30 mg oral delayed release capsule: 1 cap(s) orally once a day (:)  gabapentin 300 mg oral capsule: 1 cap(s) orally in AM  3 caps at bedtime (:)  Lipitor 20 mg oral tablet: 1 tab(s) orally once a day (:)  nortriptyline 10 mg oral capsule: 1 cap(s) orally once a day (at bedtime) (:)  Proventil HFA 90 mcg/inh inhalation aerosol: 2 puff(s) inhaled 4 times a day, As Needed ()  verapamil 180 mg/12 hours oral tablet, extended release: 2 tab(s) orally in AM  1 tab orally PM (:)      =========================================================================================    HOSPITAL MEDS.    MEDICATIONS  (STANDING):  ALBUTerol/ipratropium for Nebulization 3 milliLiter(s) Nebulizer every 6 hours  aspirin  chewable 81 milliGRAM(s) Oral daily  atorvastatin 80 milliGRAM(s) Oral at bedtime  carvedilol 6.25 milliGRAM(s) Oral every 12 hours  cefTRIAXone   IVPB      cefTRIAXone   IVPB 1 Gram(s) IV Intermittent once  clopidogrel Tablet 75 milliGRAM(s) Oral daily  DULoxetine 30 milliGRAM(s) Oral daily  furosemide    Tablet 20 milliGRAM(s) Oral two times a day  gabapentin 300 milliGRAM(s) Oral <User Schedule>  influenza   Vaccine 0.5 milliLiter(s) IntraMuscular once  nortriptyline 10 milliGRAM(s) Oral at bedtime    MEDICATIONS  (PRN):  ALBUTerol    0.083% 2.5 milliGRAM(s) Nebulizer every 4 hours PRN Wheezing  baclofen 10 milliGRAM(s) Oral three times a day PRN Muscle Spasm

## 2019-02-10 NOTE — PROGRESS NOTE ADULT - SUBJECTIVE AND OBJECTIVE BOX
[facility]                                                       [facility]                                                        Office: [address] [address]                                                       Telephone: [phone]. Fax:[phone]                                                                             PROGRESS NOTE   Reason for follow up:    CAD                           Overnight: No new events.   Update:   no new updates. plan for cath tomorrow.   Subjective: "i am fine"   Complains of: no new symptoms.     Review of symptoms: Cardiac:  No chest pain. No dyspnea. No palpitations.  Respiratory: no cough. No dyspnea  Gastrointestinal: No diarrhea. No abdominal pain. No bleeding.     Past medical history: No updates.   Chronic conditions:  HEALTH ISSUES -  Acute systolic right heart failure: Acute systolic right heart failure  HARDIK (acute kidney injury): HARDIK (acute kidney injury  Cardiomyopathy: Cardiomyopathy  NSTEMI (non-ST elevated myocardial infarction): NSTEMI (non-ST elevated myocardial infarction)  Coronary artery disease: Coronary artery disease  Pericardial effusion: Pericardial effusion    	  Vitals:  Vital Signs Last 24 Hrs  T(C): 36.8 (02-10-19 @ 15:21), Max: 36.9 (02-10-19 @ 11:52)  T(F): 98.3 (02-10-19 @ 15:21), Max: 98.4 (02-10-19 @ 11:52)  HR: 88 (02-10-19 @ 17:06) (78 - 103)  BP: 138/70 (02-10-19 @ 17:06) (120/56 - 154/80)  BP(mean): --  RR: 17 (02-10-19 @ 15:21) (15 - 18)  SpO2: 95% (02-10-19 @ 15:21) (92% - 96%)      PHYSICAL EXAM:  Appearance: Comfortable. No acute distress  HEENT:  Head and neck: Atraumatic. Normocephalic.  Normal oral mucosa, PERRL, Neck is supple. No JVD, No carotid bruit.   Neurologic: A & O x 3, no focal deficits. EOMI , Cranial nerves are intact.  Lymphatic: No cervical lymphadenopathy  Cardiovascular: Normal S1 S2, No murmur, rubs/gallops. No JVD, No edema  Respiratory: Lungs clear to auscultation  Gastrointestinal:  Soft, Non-tender, + BS  Lower Extremities: No edema  Psychiatry: Patient is calm. No agitation. Mood & affect appropriate  Skin: No rashes/ ecchymoses/cyanosis/ulcers visualized on the face, hands or feet.    CURRENT MEDICATIONS:  MEDICATIONS  (STANDING):  carvedilol 6.25 milliGRAM(s) Oral every 12 hours  furosemide    Tablet 20 milliGRAM(s) Oral two times a day    cefTRIAXone   IVPB  cefTRIAXone   IVPB  aspirin  chewable  atorvastatin  clopidogrel Tablet  DULoxetine  gabapentin  influenza   Vaccine  nortriptyline    PRN: acetaminophen   Tablet .. PRN  ALBUTerol    0.083% PRN  ALBUTerol/ipratropium for Nebulization PRN  baclofen PRN      LABS:	 	                        9.8    10.5  )-----------( 244      ( 10 Feb 2019 06:39 )             30.7   N=x    ; L=x                TELEMETRY: Sr 70's, bb noted.

## 2019-02-10 NOTE — PROGRESS NOTE ADULT - ASSESSMENT
74 y/o female with pmhx of CAD s/p RONNY in 2008, SLE with nephropathy/CKD, depression, htn, breast and colon CA in remission since 1998 who presented to [facility] with progressive shortness of breath, chest pain, wheezing and headache, transferred for management of HF exac complicated by resp failure, admitted directly to icu.     downgraded to med floors.    presentation complicated by nstemi.     > NSTEMI - 80% mid LAD lesion noted.  Awaiting Cath, not done @ time of presentation due to renal function.  Continue ASA, Statin, BBlocker.  Awaiting Staged PCI Monday  >UTI - noted to have abn ua from 2/8, complicated by urinary freq. UC ordered. start rocephin x3d, can change regimen based on cx results  > Acute Hypoxic Respiratory Failure - resolved. sec to pulm edema in setting of hf exac. now off BIPAP.  Monitor SaO2.  Supportive care.  > Acute Systolic CHF - icomplicated by pericardial effusion + resp failure. mproved after diuresis.  Monitor I&Os. exac resolved  > Depression - continue Nortriptyline, Duloxetine. stable, uncomplicated  > DVT prophylaxis - on Heparin 74 y/o female with pmhx of CAD s/p RONNY in 2008, SLE with nephropathy/CKD, depression, htn, breast and colon CA in remission since 1998 who presented to [facility] [place] with progressive shortness of breath, chest pain, wheezing and headache, transferred for management of HF exac complicated by resp failure, admitted directly to icu.     downgraded to med floors.    presentation complicated by nstemi.     > NSTEMI - 80% mid LAD lesion noted.  Awaiting Cath, not done @ time of presentation due to renal function.  Continue ASA, Statin, BBlocker.  Awaiting Staged PCI Monday  >UTI - noted to have abn ua from 2/8, complicated by urinary freq. UC ordered. start rocephin x3d, can change regimen based on cx results  > Acute Hypoxic Respiratory Failure - resolved. sec to pulm edema in setting of hf exac. now off BIPAP.  Monitor SaO2.  Supportive care.  > Acute Systolic CHF/ CMP - icomplicated by pericardial effusion + resp failure. mproved after diuresis. Echo with EF 20%.  Monitor I&Os. exac resolved  > Depression - continue Nortriptyline, Duloxetine. stable, uncomplicated  > DVT prophylaxis - on Heparin

## 2019-02-11 LAB
ANION GAP SERPL CALC-SCNC: 13 MMOL/L — SIGNIFICANT CHANGE UP (ref 5–17)
BASOPHILS NFR BLD AUTO: 1 % — SIGNIFICANT CHANGE UP (ref 0–2)
BUN SERPL-MCNC: 33 MG/DL — HIGH (ref 8–20)
CALCIUM SERPL-MCNC: 9 MG/DL — SIGNIFICANT CHANGE UP (ref 8.6–10.2)
CHLORIDE SERPL-SCNC: 106 MMOL/L — SIGNIFICANT CHANGE UP (ref 98–107)
CO2 SERPL-SCNC: 22 MMOL/L — SIGNIFICANT CHANGE UP (ref 22–29)
CREAT SERPL-MCNC: 1.68 MG/DL — HIGH (ref 0.5–1.3)
EOSINOPHIL # BLD AUTO: 0.6 K/UL — HIGH (ref 0–0.5)
EOSINOPHIL NFR BLD AUTO: 3 % — SIGNIFICANT CHANGE UP (ref 0–5)
GLUCOSE SERPL-MCNC: 118 MG/DL — HIGH (ref 70–115)
HCT VFR BLD CALC: 31.5 % — LOW (ref 37–47)
HGB BLD-MCNC: 9.9 G/DL — LOW (ref 12–16)
LYMPHOCYTES # BLD AUTO: 17 % — LOW (ref 20–55)
LYMPHOCYTES # BLD AUTO: 2.1 K/UL — SIGNIFICANT CHANGE UP (ref 1–4.8)
MAGNESIUM SERPL-MCNC: 1.7 MG/DL — SIGNIFICANT CHANGE UP (ref 1.6–2.6)
MCHC RBC-ENTMCNC: 30.1 PG — SIGNIFICANT CHANGE UP (ref 27–31)
MCHC RBC-ENTMCNC: 31.4 G/DL — LOW (ref 32–36)
MCV RBC AUTO: 95.7 FL — SIGNIFICANT CHANGE UP (ref 81–99)
MONOCYTES # BLD AUTO: 1.5 K/UL — HIGH (ref 0–0.8)
MONOCYTES NFR BLD AUTO: 12 % — HIGH (ref 3–10)
NEUTROPHILS # BLD AUTO: 7.1 K/UL — SIGNIFICANT CHANGE UP (ref 1.8–8)
NEUTROPHILS NFR BLD AUTO: 65 % — SIGNIFICANT CHANGE UP (ref 37–73)
NEUTS BAND # BLD: 2 % — SIGNIFICANT CHANGE UP (ref 0–8)
OVALOCYTES BLD QL SMEAR: SLIGHT — SIGNIFICANT CHANGE UP
PHOSPHATE SERPL-MCNC: 3.6 MG/DL — SIGNIFICANT CHANGE UP (ref 2.4–4.7)
PLAT MORPH BLD: NORMAL — SIGNIFICANT CHANGE UP
PLATELET # BLD AUTO: 270 K/UL — SIGNIFICANT CHANGE UP (ref 150–400)
POIKILOCYTOSIS BLD QL AUTO: SLIGHT — SIGNIFICANT CHANGE UP
POTASSIUM SERPL-MCNC: 3.8 MMOL/L — SIGNIFICANT CHANGE UP (ref 3.5–5.3)
POTASSIUM SERPL-SCNC: 3.8 MMOL/L — SIGNIFICANT CHANGE UP (ref 3.5–5.3)
RBC # BLD: 3.29 M/UL — LOW (ref 4.4–5.2)
RBC # FLD: 12.2 % — SIGNIFICANT CHANGE UP (ref 11–15.6)
RBC BLD AUTO: ABNORMAL
SODIUM SERPL-SCNC: 141 MMOL/L — SIGNIFICANT CHANGE UP (ref 135–145)
WBC # BLD: 11.4 K/UL — HIGH (ref 4.8–10.8)
WBC # FLD AUTO: 11.4 K/UL — HIGH (ref 4.8–10.8)

## 2019-02-11 PROCEDURE — 99152 MOD SED SAME PHYS/QHP 5/>YRS: CPT | Mod: GC

## 2019-02-11 PROCEDURE — 99232 SBSQ HOSP IP/OBS MODERATE 35: CPT

## 2019-02-11 PROCEDURE — 76937 US GUIDE VASCULAR ACCESS: CPT | Mod: 26,GC

## 2019-02-11 PROCEDURE — 99233 SBSQ HOSP IP/OBS HIGH 50: CPT

## 2019-02-11 PROCEDURE — 92928 PRQ TCAT PLMT NTRAC ST 1 LES: CPT | Mod: LD,GC

## 2019-02-11 PROCEDURE — 99231 SBSQ HOSP IP/OBS SF/LOW 25: CPT | Mod: 25

## 2019-02-11 PROCEDURE — 93010 ELECTROCARDIOGRAM REPORT: CPT

## 2019-02-11 RX ORDER — SODIUM CHLORIDE 9 MG/ML
1000 INJECTION INTRAMUSCULAR; INTRAVENOUS; SUBCUTANEOUS
Qty: 0 | Refills: 0 | Status: DISCONTINUED | OUTPATIENT
Start: 2019-02-11 | End: 2019-02-12

## 2019-02-11 RX ADMIN — Medication 650 MILLIGRAM(S): at 06:30

## 2019-02-11 RX ADMIN — CLOPIDOGREL BISULFATE 75 MILLIGRAM(S): 75 TABLET, FILM COATED ORAL at 12:06

## 2019-02-11 RX ADMIN — Medication 20 MILLIGRAM(S): at 19:06

## 2019-02-11 RX ADMIN — ATORVASTATIN CALCIUM 80 MILLIGRAM(S): 80 TABLET, FILM COATED ORAL at 21:55

## 2019-02-11 RX ADMIN — SODIUM CHLORIDE 45 MILLILITER(S): 9 INJECTION INTRAMUSCULAR; INTRAVENOUS; SUBCUTANEOUS at 06:01

## 2019-02-11 RX ADMIN — GABAPENTIN 300 MILLIGRAM(S): 400 CAPSULE ORAL at 21:55

## 2019-02-11 RX ADMIN — CEFTRIAXONE 100 GRAM(S): 500 INJECTION, POWDER, FOR SOLUTION INTRAMUSCULAR; INTRAVENOUS at 21:57

## 2019-02-11 RX ADMIN — NORTRIPTYLINE HYDROCHLORIDE 10 MILLIGRAM(S): 10 CAPSULE ORAL at 21:55

## 2019-02-11 RX ADMIN — Medication 20 MILLIGRAM(S): at 05:26

## 2019-02-11 RX ADMIN — GABAPENTIN 300 MILLIGRAM(S): 400 CAPSULE ORAL at 10:20

## 2019-02-11 RX ADMIN — Medication 650 MILLIGRAM(S): at 05:26

## 2019-02-11 RX ADMIN — DULOXETINE HYDROCHLORIDE 30 MILLIGRAM(S): 30 CAPSULE, DELAYED RELEASE ORAL at 19:06

## 2019-02-11 RX ADMIN — Medication 81 MILLIGRAM(S): at 12:06

## 2019-02-11 RX ADMIN — CARVEDILOL PHOSPHATE 6.25 MILLIGRAM(S): 80 CAPSULE, EXTENDED RELEASE ORAL at 19:06

## 2019-02-11 RX ADMIN — CARVEDILOL PHOSPHATE 6.25 MILLIGRAM(S): 80 CAPSULE, EXTENDED RELEASE ORAL at 05:26

## 2019-02-11 NOTE — PROGRESS NOTE ADULT - ASSESSMENT
72 y/o female with pmhx of CAD s/p RONNY in 2008, SLE with nephropathy depression, htn, breast and colon CA in remission since 1998 who presented to [facility] with progressive shortness of breath, chest pain, wheezing and headache. Transfer from [facility] for further management possible pericardial effusion elevated troponins and new LBBB on EKG. She was admitted to MICU in respiratory distress, placed on BIPAP and diuresed.  Symptoms improved, pt weaned off BIPAP.  TTE showed EF 20-25%, trace pericardial effusion with no tamponade physiology appreciated. Pt had Cath which  showed Mid LAD 80% stenosis, awaiting staged PCI due to renal dysfunction.       A/P    Acute systolic heart failure, HFrEF  Cont Carvedilol, lasix  Not on  ACEI, ARB, Entresto, Spironolactone due toe HARDIK    NSTEMI (non-ST elevated myocardial infarction)  Patient with mid LAD disease.  PCI planned for today due to renal dysfunction.   Aspirin, plavix, bb, statin.     HARDIK (acute kidney injury).   Imroved.  Cr 1.68  Normal saline 0.5 ml/kg/hour Pre - PCI,  Lasix IV PRN, if PCWP Elevated during PCI,    UTI  On Rocephin x3    CARDIO MEDS  carvedilol 6.25 milliGRAM(s) Oral every 12 hours  furosemide    Tablet 20 milliGRAM(s) Oral two times a day  atorvastatin 80 milliGRAM(s) Oral at bedtime  aspirin  chewable 81 milliGRAM(s) Oral daily  clopidogrel Tablet 75 milliGRAM(s) Oral daily

## 2019-02-11 NOTE — DIETITIAN INITIAL EVALUATION ADULT. - OTHER INFO
72 yo female, PMHx of CAD s/p RONNY in 2008, SLE GN /CKD- 4 , HTN, breast and colon CA in remission since 1998 p/w progressive shortness of breath, chest pain, wheezing and headache, HF exacerbation complicated by respiratory failure, NSTEMI with HARDIK. Attempted to interview x 2, pt off unit at cath lab. No family at bedside to obtain nutrition hx. Prior to NPO status, good po intake documented. RD to follow up with subjective interview/diet education as feasible.

## 2019-02-11 NOTE — PROGRESS NOTE ADULT - SUBJECTIVE AND OBJECTIVE BOX
This is a 74 y/o F with a PMH CAD with PCI (2008), SLE with nephropathy/ CKD, depression, HTN, breast and colon CA; NSTEMI, now s/p LHC with Dr. Arthur    mLAD 3.0mm x18mm resolute jennifer RONNY placed via left groin approach; angioseal in place.  Pt currently denies chest pain, SOB, dyspnea, palpitations    Neuro: A&Ox4, neurologically intact, ROM intact  Pulm: CTAB  Cardiac: NSR w PACs 87bpm on ekg, S1,S2  Vascular: palpable pulses x4 extremities, no edema present; left groin site; +angioseal; soft and nontender to touch, pt denies any numbness/tingling to left lower extremity.      -in-patient level of care; low GFR, elevated creatinine, NSTEMI <7 days  -cont. IVF as rx  -cont. asa and plavix daily  -post cardiac cath orders  -left groin site precautions  -EKG reviewed post cath

## 2019-02-11 NOTE — PROGRESS NOTE ADULT - PROBLEM SELECTOR PROBLEM 1
Acute systolic right heart failure
HARDIK (acute kidney injury)
NSTEMI (non-ST elevated myocardial infarction)

## 2019-02-11 NOTE — PROGRESS NOTE ADULT - SUBJECTIVE AND OBJECTIVE BOX
CC: back pain    Patient seen and examined at the bedside. No acute overnight events. no events yesterday. No complaints this AM. Denies fever/chills, headache, lightheadedness, dizziness, chest pain, palpitations, shortness of breath, cough, abd pain, nausea/vomiting/diarrhea      =========================================================================================  T(C): 36.8 (19 @ 15:56), Max: 37.1 (19 @ 21:48)  HR: 85 (19 @ 17:30) (73 - 89)  BP: 139/62 (19 @ 17:30) (110/44 - 139/62)  RR: 18 (19 @ 15:56) (17 - 18)  SpO2: 98% (19 @ 16:00) (93% - 99%)    PHYSICAL EXAM.    GEN - appears age appropriate. well nourished. pleasant. no distress.   HEENT - NCAT, EOMI, HORTENCIA  RESP - CTA BL, no wheeze/stridor/rhonchi/crackles. not on supplemental O2. able to speak in full sentences without distress.   CARDIO - NS1S2, RRR. No murmurs/rubs/gallops.  ABD - Soft/Non tender/Non distended. Normal BS x4 quadrants. no guarding/rebound tenderness.  Ext - No CASSI.  MSK - BL 5/5 strength on upper and lower extremities.   Neuro - AAOx3. cn 2-12 grossly intact  Psych - normal affect  Skin - c/d/i. no rashes/lesions      I&O's Summary    2019 07:01  -  2019 07:00  --------------------------------------------------------  IN: 1000 mL / OUT: 0 mL / NET: 1000 mL    2019 07:01  -  2019 19:48  --------------------------------------------------------  IN: 1350 mL / OUT: 0 mL / NET: 1350 mL      Daily     Daily Weight in k.6 (2019 05:14)    =========================================================================================  LABS.    02-08    139  |  106  |  68.0<H>  ----------------------------<  100  3.8   |  22.0  |  2.34<H>    Ca    8.8      2019 06:07  Phos  3.5     02-08  Mg     2.0     02-08    TPro  5.2<L>  /  Alb  3.1<L>  /  TBili  <0.2<L>  /  DBili  x   /  AST  20  /  ALT  16  /  AlkPhos  102  02-08                          8.8    12.6  )-----------( 225      ( 2019 06:21 )             28.2     LIVER FUNCTIONS - ( 2019 06:07 )  Alb: 3.1 g/dL / Pro: 5.2 g/dL / ALK PHOS: 102 U/L / ALT: 16 U/L / AST: 20 U/L / GGT: x           PTT - ( 2019 01:30 )  PTT:153.5 sec      Urinalysis Basic - ( 2019 05:08 )    Color: Yellow / Appearance: Slightly Turbid / S.015 / pH: x  Gluc: x / Ketone: Negative  / Bili: Negative / Urobili: Negative mg/dL   Blood: x / Protein: 30 mg/dL / Nitrite: Negative   Leuk Esterase: Trace / RBC: >50 /HPF / WBC 6-10   Sq Epi: x / Non Sq Epi: Occasional / Bacteria: Few          =========================================================================================  IMAGING.     =========================================================================================    HOME MEDS.    Home Medications:  baclofen 10 mg oral tablet: 1 tab(s) orally 3 times a day (:)  Breo Ellipta 100 mcg-25 mcg/inh inhalation powder: 1 puff(s) inhaled once a day (:)  Cymbalta 30 mg oral delayed release capsule: 1 cap(s) orally once a day (:)  gabapentin 300 mg oral capsule: 1 cap(s) orally in AM  3 caps at bedtime (:)  Lipitor 20 mg oral tablet: 1 tab(s) orally once a day (:)  nortriptyline 10 mg oral capsule: 1 cap(s) orally once a day (at bedtime) (:)  Proventil HFA 90 mcg/inh inhalation aerosol: 2 puff(s) inhaled 4 times a day, As Needed (:)  verapamil 180 mg/12 hours oral tablet, extended release: 2 tab(s) orally in AM  1 tab orally PM (:)      =========================================================================================    HOSPITAL MEDS.    MEDICATIONS  (STANDING):  ALBUTerol/ipratropium for Nebulization 3 milliLiter(s) Nebulizer every 6 hours  aspirin  chewable 81 milliGRAM(s) Oral daily  atorvastatin 80 milliGRAM(s) Oral at bedtime  carvedilol 6.25 milliGRAM(s) Oral every 12 hours  cefTRIAXone   IVPB      cefTRIAXone   IVPB 1 Gram(s) IV Intermittent once  clopidogrel Tablet 75 milliGRAM(s) Oral daily  DULoxetine 30 milliGRAM(s) Oral daily  furosemide    Tablet 20 milliGRAM(s) Oral two times a day  gabapentin 300 milliGRAM(s) Oral <User Schedule>  influenza   Vaccine 0.5 milliLiter(s) IntraMuscular once  nortriptyline 10 milliGRAM(s) Oral at bedtime    MEDICATIONS  (PRN):  ALBUTerol    0.083% 2.5 milliGRAM(s) Nebulizer every 4 hours PRN Wheezing  baclofen 10 milliGRAM(s) Oral three times a day PRN Muscle Spasm

## 2019-02-11 NOTE — PROGRESS NOTE ADULT - ASSESSMENT
72 y/o female with pmhx of CAD s/p RONNY in 2008, SLE with nephropathy/CKD, depression, htn, breast and colon CA in remission since 1998 who presented to [facility] with progressive shortness of breath, chest pain, wheezing and headache, transferred for management of HF exac complicated by resp failure, admitted directly to icu.     downgraded to med floors.    presentation complicated by nstemi.     > NSTEMI - 80% mid LAD lesion noted.  s/p PCI to mid LAD with 1 RONNY. Continue ASA, Statin, BBlocker.   >UTI - noted to have abn ua from 2/8, complicated by urinary freq. UC ordered. start rocephin x3d, can change regimen based on cx results  > Acute Hypoxic Respiratory Failure - resolved. sec to pulm edema in setting of hf exac. now off BIPAP.  Monitor SaO2.  Supportive care.  > Acute Systolic CHF/ CMP - complicated by pericardial effusion + resp failure. Improved after diuresis. Echo with EF 20%.  Monitor I&Os. exac resolved. Cont Carvedilol, lasix. Not on  ACEI, ARB, Entresto, Spironolactone due to HARDIK    >HARDIK-Improved  Cr 1.68  Normal saline 0.5 ml/kg/hour Pre - PCI,  Lasix IV PRN, if PCWP Elevated during PCI,    > Depression - continue Nortriptyline, Duloxetine. stable, uncomplicated  > DVT prophylaxis - on Heparin

## 2019-02-11 NOTE — DIETITIAN INITIAL EVALUATION ADULT. - PERTINENT LABORATORY DATA
02-11 Na141 mmol/L Glu 118 mg/dL<H> K+ 3.8 mmol/L Cr  1.68 mg/dL<H> BUN 33.0 mg/dL<H> Phos 3.6 mg/dL Alb n/a   PAB n/a

## 2019-02-11 NOTE — PROGRESS NOTE ADULT - SUBJECTIVE AND OBJECTIVE BOX
Patient s/p PCI to mid LAD with 1 RONNY.   Left femoral angioseal.   Plan for aspirin and plavix.     14 ccs of contrast used for procedure.     if stable, consider dc planning for tomorrow on current regimen.

## 2019-02-11 NOTE — PROGRESS NOTE ADULT - PROBLEM SELECTOR PROBLEM 3
Acute respiratory failure with hypoxia
Acute systolic right heart failure
Wheezing

## 2019-02-11 NOTE — PROGRESS NOTE ADULT - ASSESSMENT
74 yo female, PMHx of CAD s/p RONNY in 2008, SLE GN /CKD- 4 , HTN, breast and colon CA in remission since 1998 who presented to [facility] [place] with progressive shortness of breath, chest pain, wheezing and headache, transferred for management of HF exacerbation  complicated by respiratory  failure, admitted directly to icu. Presentation complicated by NSTEMI with HARDIK.

## 2019-02-11 NOTE — DIETITIAN INITIAL EVALUATION ADULT. - PROBLEM SELECTOR PLAN 2
-aspirin, statin, metoprolol started. will likely need cath tomorrow. holding ace for now given renal function and need for cath.

## 2019-02-11 NOTE — PROGRESS NOTE ADULT - PROBLEM SELECTOR PLAN 2
Patient with mid LAD disease. PCI planned for monday due to renal dysfunction.   Aspirin, plavix, statin.
Plan for Cath and further management per Cardiology and Primary team
Ischemic?  Started on beta blocker, hold off on ACE until we ensure renal function is stable.

## 2019-02-11 NOTE — PROGRESS NOTE ADULT - SUBJECTIVE AND OBJECTIVE BOX
CARDIOLOGY PROGRESS NOTE   ([facility])                                                                                                        Subjective:     Vitals:  T(C): 36.4 (02-11-19 @ 05:25), Max: 36.9 (02-10-19 @ 11:52)  HR: 86 (02-11-19 @ 05:25) (79 - 103)  BP: 130/60 (02-11-19 @ 05:25) (128/60 - 154/80)  RR: 18 (02-11-19 @ 05:25) (15 - 18)  SpO2: 95% (02-11-19 @ 05:25) (95% - 95%)  Wt(kg): --  I&O's Summary    10 Feb 2019 07:01  -  11 Feb 2019 07:00  --------------------------------------------------------  IN: 480 mL / OUT: 0 mL / NET: 480 mL          PHYSICAL EXAM:  Appearance: Normal	  HEENT:   Atraumatic  Cardiovascular: Normal S1 S2, No JVD, No murmurs, No edema  Respiratory: Lungs clear to auscultation	  Gastrointestinal:  Soft, Non-tender, + BS	  Skin: No rashes, No ecchymoses, No cyanosis  Neurologic: Alert and awake, able to move extremities  Extremities: No edema    TELEMETRY: 	    ECG:  	      DIAGNOSTIC TESTING:  [ ] Echocardiogram:  [ ]  Catheterization:  [ ] Stress Test:    OTHER: 	      CURRENT MEDICATIONS:  carvedilol 6.25 milliGRAM(s) Oral every 12 hours  furosemide    Tablet 20 milliGRAM(s) Oral two times a day    cefTRIAXone   IVPB      cefTRIAXone   IVPB 1 Gram(s) IV Intermittent every 24 hours    ALBUTerol    0.083% 2.5 milliGRAM(s) Nebulizer every 4 hours PRN  ALBUTerol/ipratropium for Nebulization 3 milliLiter(s) Nebulizer every 6 hours PRN    acetaminophen   Tablet .. 650 milliGRAM(s) Oral every 6 hours PRN  baclofen 10 milliGRAM(s) Oral three times a day PRN  DULoxetine 30 milliGRAM(s) Oral daily  gabapentin 300 milliGRAM(s) Oral <User Schedule>  nortriptyline 10 milliGRAM(s) Oral at bedtime      atorvastatin 80 milliGRAM(s) Oral at bedtime    aspirin  chewable 81 milliGRAM(s) Oral daily  clopidogrel Tablet 75 milliGRAM(s) Oral daily  influenza   Vaccine 0.5 milliLiter(s) IntraMuscular once  sodium chloride 0.9%. 1000 milliLiter(s) IV Continuous <Continuous>      LABS:	 	    CARDIAC MARKERS:  Troponin I:   CPK total =  CK MB=   CKMB index=                           9.9    11.4  )-----------( 270      ( 11 Feb 2019 06:02 )             31.5     02-11    141  |  106  |  33.0<H>  ----------------------------<  118<H>  3.8   |  22.0  |  1.68<H>    Ca    9.0      11 Feb 2019 06:02  Phos  3.6     02-11  Mg     1.7     02-11      proBNP: Serum Pro-Brain Natriuretic Peptide: 83899 pg/mL (02-07 @ 17:05)  Serum Pro-Brain Natriuretic Peptide: 24111 pg/mL (02-07 @ 00:54)    Lipid Profile:   HgA1c:   TSH: CARDIOLOGY PROGRESS NOTE   ([facility])                                                                                                        Subjective: feels well, denied CP, comfortable, nad,  laying in bed, c/o back pain    Vitals:  T(C): 36.4 (02-11-19 @ 05:25), Max: 36.9 (02-10-19 @ 11:52)  HR: 86 (02-11-19 @ 05:25) (79 - 103)  BP: 130/60 (02-11-19 @ 05:25) (128/60 - 154/80)  RR: 18 (02-11-19 @ 05:25) (15 - 18)  SpO2: 95% (02-11-19 @ 05:25) (95% - 95%)  Wt(kg): --  I&O's Summary    10 Feb 2019 07:01  -  11 Feb 2019 07:00  --------------------------------------------------------  IN: 480 mL / OUT: 0 mL / NET: 480 mL          PHYSICAL EXAM:  Appearance: Normal	  HEENT:   Atraumatic  Cardiovascular: Normal S1 S2, No JVD, No murmurs, No edema  Respiratory: Lungs clear to auscultation	  Gastrointestinal:  Soft, Non-tender, + BS	  Skin: No rashes, No ecchymoses, No cyanosis  Neurologic: Alert and awake, able to move extremities  Extremities: No edema    TELEMETRY: 	  SR      CURRENT MEDICATIONS:  carvedilol 6.25 milliGRAM(s) Oral every 12 hours  furosemide    Tablet 20 milliGRAM(s) Oral two times a day    cefTRIAXone   IVPB      cefTRIAXone   IVPB 1 Gram(s) IV Intermittent every 24 hours    ALBUTerol    0.083% 2.5 milliGRAM(s) Nebulizer every 4 hours PRN  ALBUTerol/ipratropium for Nebulization 3 milliLiter(s) Nebulizer every 6 hours PRN    acetaminophen   Tablet .. 650 milliGRAM(s) Oral every 6 hours PRN  baclofen 10 milliGRAM(s) Oral three times a day PRN  DULoxetine 30 milliGRAM(s) Oral daily  gabapentin 300 milliGRAM(s) Oral <User Schedule>  nortriptyline 10 milliGRAM(s) Oral at bedtime      atorvastatin 80 milliGRAM(s) Oral at bedtime    aspirin  chewable 81 milliGRAM(s) Oral daily  clopidogrel Tablet 75 milliGRAM(s) Oral daily  influenza   Vaccine 0.5 milliLiter(s) IntraMuscular once  sodium chloride 0.9%. 1000 milliLiter(s) IV Continuous <Continuous>      LABS:	 	                            9.9    11.4  )-----------( 270      ( 11 Feb 2019 06:02 )             31.5     02-11    141  |  106  |  33.0<H>  ----------------------------<  118<H>  3.8   |  22.0  |  1.68<H>    Ca    9.0      11 Feb 2019 06:02  Phos  3.6     02-11  Mg     1.7     02-11      proBNP: Serum Pro-Brain Natriuretic Peptide: 83875 pg/mL (02-07 @ 17:05)  Serum Pro-Brain Natriuretic Peptide: 55439 pg/mL (02-07 @ 00:54)

## 2019-02-11 NOTE — PROGRESS NOTE ADULT - PROBLEM SELECTOR PROBLEM 2
HARDIK (acute kidney injury)
NSTEMI (non-ST elevated myocardial infarction)
NSTEMI (non-ST elevated myocardial infarction)
Cardiomyopathy
HARDIK (acute kidney injury)

## 2019-02-11 NOTE — PROGRESS NOTE ADULT - PROBLEM SELECTOR PLAN 1
Bun/Cr improvement noted after pre-hydration with NS  May give Lasix IV PRN if PCWP noted to be elevated during PCI.
Plan for lasix 20 mg po bid. LVEDp ~20 mmHg.
ASA, statin, beta blocker.  Will need cardiac cath.

## 2019-02-11 NOTE — PROGRESS NOTE ADULT - SUBJECTIVE AND OBJECTIVE BOX
[facility] DIVISION OF KIDNEY DISEASES AND HYPERTENSION -- FOLLOW UP NOTE  --------------------------------------------------------------------------------  Chief Complaint: For Cardiac cath today.     24 hour events/subjective:  Patient doing well at this time. Without any complains currently, says "she feels fine". Voiding without issue, clear in color as per patient. Is aware of planned cardiac cath for today.         PAST HISTORY  --------------------------------------------------------------------------------  No significant changes to PMH, PSH, FHx, SHx, unless otherwise noted    ALLERGIES & MEDICATIONS  --------------------------------------------------------------------------------  Allergies    lidocaine (Other)  predniSONE (Nephrotoxicity)        Standing Inpatient Medications  aspirin  chewable 81 milliGRAM(s) Oral daily  atorvastatin 80 milliGRAM(s) Oral at bedtime  carvedilol 6.25 milliGRAM(s) Oral every 12 hours  cefTRIAXone   IVPB      cefTRIAXone   IVPB 1 Gram(s) IV Intermittent every 24 hours  clopidogrel Tablet 75 milliGRAM(s) Oral daily  DULoxetine 30 milliGRAM(s) Oral daily  furosemide    Tablet 20 milliGRAM(s) Oral two times a day  gabapentin 300 milliGRAM(s) Oral <User Schedule>  influenza   Vaccine 0.5 milliLiter(s) IntraMuscular once  nortriptyline 10 milliGRAM(s) Oral at bedtime  sodium chloride 0.9%. 1000 milliLiter(s) IV Continuous <Continuous>    PRN Inpatient Medications  acetaminophen   Tablet .. 650 milliGRAM(s) Oral every 6 hours PRN  ALBUTerol    0.083% 2.5 milliGRAM(s) Nebulizer every 4 hours PRN  ALBUTerol/ipratropium for Nebulization 3 milliLiter(s) Nebulizer every 6 hours PRN  baclofen 10 milliGRAM(s) Oral three times a day PRN      REVIEW OF SYSTEMS  --------------------------------------------------------------------------------  Gen: No weight changes, fatigue, fevers/chills, weakness  Skin: No rashes  Head/Eyes/Ears/Mouth: No headache; Normal hearing; Normal vision w/o blurriness; No sinus pain/discomfort, sore throat  Respiratory: No dyspnea, cough, wheezing, hemoptysis  CV: No chest pain, PND, orthopnea  GI: No abdominal pain, diarrhea, constipation, nausea, vomiting, melena, hematochezia  : No increased frequency, dysuria, hematuria, nocturia  MSK: No joint pain/swelling; no back pain; no edema  Neuro: No dizziness/lightheadedness, weakness, seizures, numbness, tingling  Heme: No easy bruising or bleeding  Endo: No heat/cold intolerance  Psych: No significant nervousness, anxiety, stress, depression    All other systems were reviewed and are negative, except as noted.    VITALS/PHYSICAL EXAM  --------------------------------------------------------------------------------  T(C): 36.7 (02-11-19 @ 11:57), Max: 36.8 (02-10-19 @ 15:21)  HR: 87 (02-11-19 @ 11:57) (78 - 103)  BP: 178/62 (02-11-19 @ 11:57) (125/70 - 178/62)  RR: 20 (02-11-19 @ 11:57) (17 - 20)  SpO2: 96% (02-11-19 @ 11:57) (95% - 96%)        02-10-19 @ 07:01  -  02-11-19 @ 07:00  --------------------------------------------------------  IN: 480 mL / OUT: 0 mL / NET: 480 mL      Physical Exam:  Gen: NAD, well-appearing  HEENT: PERRL, clear oropharynx  Pulm: CTA B/L  CV: RRR, S1S2; no rub  Abd: +BS, soft, nontender/nondistended  : No suprapubic tenderness  UE: Warm, FROM, no clubbing, intact strength; no edema; no asterixis  LE: Warm, FROM, no clubbing, intact strength; no edema  Neuro: No focal deficits, intact gait  Psych: Normal affect and mood  Skin: Warm, without rashes      LABS/STUDIES  --------------------------------------------------------------------------------              9.9    11.4  >-----------<  270      [02-11-19 @ 06:02]              31.5     141  |  106  |  33.0  ----------------------------<  118      [02-11-19 @ 06:02]  3.8   |  22.0  |  1.68        Ca     9.0     [02-11-19 @ 06:02]      Mg     1.7     [02-11-19 @ 06:02]      Phos  3.6     [02-11-19 @ 06:02]            Creatinine Trend:  SCr 1.68 [02-11 @ 06:02]  SCr 2.34 [02-08 @ 06:07]  SCr 2.80 [02-07 @ 00:54]    Urinalysis - [02-08-19 @ 05:08]      Color Yellow / Appearance Slightly Turbid / SG 1.015 / pH 6.0      Gluc Negative / Ketone Negative  / Bili Negative / Urobili Negative       Blood Large / Protein 30 / Leuk Est Trace / Nitrite Negative      RBC >50 / WBC 6-10 / Hyaline  / Gran  / Sq Epi  / Non Sq Epi Occasional / Bacteria Few    Urine Creatinine 55      [02-10-19 @ 09:03]  Urine Protein 27.0      [02-10-19 @ 09:03]  Urine Sodium 98      [02-10-19 @ 09:03]  Urine Potassium 13      [02-10-19 @ 09:03]

## 2019-02-12 VITALS
RESPIRATION RATE: 18 BRPM | SYSTOLIC BLOOD PRESSURE: 128 MMHG | TEMPERATURE: 98 F | DIASTOLIC BLOOD PRESSURE: 63 MMHG | HEART RATE: 86 BPM | OXYGEN SATURATION: 98 %

## 2019-02-12 LAB
ANION GAP SERPL CALC-SCNC: 12 MMOL/L — SIGNIFICANT CHANGE UP (ref 5–17)
BASOPHILS # BLD AUTO: 0 K/UL — SIGNIFICANT CHANGE UP (ref 0–0.2)
BASOPHILS NFR BLD AUTO: 0.3 % — SIGNIFICANT CHANGE UP (ref 0–2)
BUN SERPL-MCNC: 41 MG/DL — HIGH (ref 8–20)
CALCIUM SERPL-MCNC: 9.3 MG/DL — SIGNIFICANT CHANGE UP (ref 8.6–10.2)
CHLORIDE SERPL-SCNC: 102 MMOL/L — SIGNIFICANT CHANGE UP (ref 98–107)
CO2 SERPL-SCNC: 23 MMOL/L — SIGNIFICANT CHANGE UP (ref 22–29)
CREAT SERPL-MCNC: 1.98 MG/DL — HIGH (ref 0.5–1.3)
EOSINOPHIL # BLD AUTO: 0.6 K/UL — HIGH (ref 0–0.5)
EOSINOPHIL NFR BLD AUTO: 4.1 % — SIGNIFICANT CHANGE UP (ref 0–6)
GLUCOSE SERPL-MCNC: 128 MG/DL — HIGH (ref 70–115)
HCT VFR BLD CALC: 30.5 % — LOW (ref 37–47)
HGB BLD-MCNC: 9.8 G/DL — LOW (ref 12–16)
LYMPHOCYTES # BLD AUTO: 16.5 % — LOW (ref 20–55)
LYMPHOCYTES # BLD AUTO: 2.2 K/UL — SIGNIFICANT CHANGE UP (ref 1–4.8)
MAGNESIUM SERPL-MCNC: 1.8 MG/DL — SIGNIFICANT CHANGE UP (ref 1.6–2.6)
MCHC RBC-ENTMCNC: 30.7 PG — SIGNIFICANT CHANGE UP (ref 27–31)
MCHC RBC-ENTMCNC: 32.1 G/DL — SIGNIFICANT CHANGE UP (ref 32–36)
MCV RBC AUTO: 95.6 FL — SIGNIFICANT CHANGE UP (ref 81–99)
MONOCYTES # BLD AUTO: 1.6 K/UL — HIGH (ref 0–0.8)
MONOCYTES NFR BLD AUTO: 11.8 % — HIGH (ref 3–10)
NEUTROPHILS # BLD AUTO: 9 K/UL — HIGH (ref 1.8–8)
NEUTROPHILS NFR BLD AUTO: 66.6 % — SIGNIFICANT CHANGE UP (ref 37–73)
PHOSPHATE SERPL-MCNC: 4.2 MG/DL — SIGNIFICANT CHANGE UP (ref 2.4–4.7)
PLATELET # BLD AUTO: 282 K/UL — SIGNIFICANT CHANGE UP (ref 150–400)
POTASSIUM SERPL-MCNC: 4.2 MMOL/L — SIGNIFICANT CHANGE UP (ref 3.5–5.3)
POTASSIUM SERPL-SCNC: 4.2 MMOL/L — SIGNIFICANT CHANGE UP (ref 3.5–5.3)
PROCALCITONIN SERPL-MCNC: 0.11 NG/ML — HIGH (ref 0–0.04)
RBC # BLD: 3.19 M/UL — LOW (ref 4.4–5.2)
RBC # FLD: 12.7 % — SIGNIFICANT CHANGE UP (ref 11–15.6)
SODIUM SERPL-SCNC: 137 MMOL/L — SIGNIFICANT CHANGE UP (ref 135–145)
WBC # BLD: 13.5 K/UL — HIGH (ref 4.8–10.8)
WBC # FLD AUTO: 13.5 K/UL — HIGH (ref 4.8–10.8)

## 2019-02-12 PROCEDURE — 76775 US EXAM ABDO BACK WALL LIM: CPT

## 2019-02-12 PROCEDURE — 84100 ASSAY OF PHOSPHORUS: CPT

## 2019-02-12 PROCEDURE — 93926 LOWER EXTREMITY STUDY: CPT

## 2019-02-12 PROCEDURE — 99233 SBSQ HOSP IP/OBS HIGH 50: CPT

## 2019-02-12 PROCEDURE — C9600: CPT | Mod: LD

## 2019-02-12 PROCEDURE — 76937 US GUIDE VASCULAR ACCESS: CPT

## 2019-02-12 PROCEDURE — 84484 ASSAY OF TROPONIN QUANT: CPT

## 2019-02-12 PROCEDURE — C1887: CPT

## 2019-02-12 PROCEDURE — 83880 ASSAY OF NATRIURETIC PEPTIDE: CPT

## 2019-02-12 PROCEDURE — 82553 CREATINE MB FRACTION: CPT

## 2019-02-12 PROCEDURE — 85610 PROTHROMBIN TIME: CPT

## 2019-02-12 PROCEDURE — 81001 URINALYSIS AUTO W/SCOPE: CPT

## 2019-02-12 PROCEDURE — 99152 MOD SED SAME PHYS/QHP 5/>YRS: CPT

## 2019-02-12 PROCEDURE — 84133 ASSAY OF URINE POTASSIUM: CPT

## 2019-02-12 PROCEDURE — 85027 COMPLETE CBC AUTOMATED: CPT

## 2019-02-12 PROCEDURE — C1874: CPT

## 2019-02-12 PROCEDURE — C1894: CPT

## 2019-02-12 PROCEDURE — 84145 PROCALCITONIN (PCT): CPT

## 2019-02-12 PROCEDURE — 93306 TTE W/DOPPLER COMPLETE: CPT

## 2019-02-12 PROCEDURE — 84300 ASSAY OF URINE SODIUM: CPT

## 2019-02-12 PROCEDURE — 94640 AIRWAY INHALATION TREATMENT: CPT

## 2019-02-12 PROCEDURE — C1725: CPT

## 2019-02-12 PROCEDURE — 84156 ASSAY OF PROTEIN URINE: CPT

## 2019-02-12 PROCEDURE — 93010 ELECTROCARDIOGRAM REPORT: CPT

## 2019-02-12 PROCEDURE — 82550 ASSAY OF CK (CPK): CPT

## 2019-02-12 PROCEDURE — 93005 ELECTROCARDIOGRAM TRACING: CPT

## 2019-02-12 PROCEDURE — 99239 HOSP IP/OBS DSCHRG MGMT >30: CPT

## 2019-02-12 PROCEDURE — 99153 MOD SED SAME PHYS/QHP EA: CPT

## 2019-02-12 PROCEDURE — 90686 IIV4 VACC NO PRSV 0.5 ML IM: CPT

## 2019-02-12 PROCEDURE — 71045 X-RAY EXAM CHEST 1 VIEW: CPT

## 2019-02-12 PROCEDURE — 93458 L HRT ARTERY/VENTRICLE ANGIO: CPT

## 2019-02-12 PROCEDURE — 80053 COMPREHEN METABOLIC PANEL: CPT

## 2019-02-12 PROCEDURE — 93926 LOWER EXTREMITY STUDY: CPT | Mod: 26,LT

## 2019-02-12 PROCEDURE — 82570 ASSAY OF URINE CREATININE: CPT

## 2019-02-12 PROCEDURE — 80048 BASIC METABOLIC PNL TOTAL CA: CPT

## 2019-02-12 PROCEDURE — C1760: CPT

## 2019-02-12 PROCEDURE — 36415 COLL VENOUS BLD VENIPUNCTURE: CPT

## 2019-02-12 PROCEDURE — 83735 ASSAY OF MAGNESIUM: CPT

## 2019-02-12 PROCEDURE — 85730 THROMBOPLASTIN TIME PARTIAL: CPT

## 2019-02-12 PROCEDURE — C1769: CPT

## 2019-02-12 RX ORDER — GABAPENTIN 400 MG/1
2 CAPSULE ORAL
Qty: 30 | Refills: 0 | OUTPATIENT
Start: 2019-02-12

## 2019-02-12 RX ORDER — FUROSEMIDE 40 MG
1 TABLET ORAL
Qty: 30 | Refills: 0 | OUTPATIENT
Start: 2019-02-12

## 2019-02-12 RX ORDER — VERAPAMIL HCL 240 MG
1 CAPSULE, EXTENDED RELEASE PELLETS 24 HR ORAL
Qty: 0 | Refills: 0 | COMMUNITY

## 2019-02-12 RX ORDER — ASPIRIN/CALCIUM CARB/MAGNESIUM 324 MG
1 TABLET ORAL
Qty: 90 | Refills: 0 | OUTPATIENT
Start: 2019-02-12

## 2019-02-12 RX ORDER — GABAPENTIN 400 MG/1
1 CAPSULE ORAL
Qty: 0 | Refills: 0 | COMMUNITY

## 2019-02-12 RX ORDER — CARVEDILOL PHOSPHATE 80 MG/1
1 CAPSULE, EXTENDED RELEASE ORAL
Qty: 60 | Refills: 0 | OUTPATIENT
Start: 2019-02-12

## 2019-02-12 RX ORDER — GABAPENTIN 400 MG/1
200 CAPSULE ORAL DAILY
Qty: 0 | Refills: 0 | Status: DISCONTINUED | OUTPATIENT
Start: 2019-02-12 | End: 2019-02-12

## 2019-02-12 RX ORDER — ATORVASTATIN CALCIUM 80 MG/1
1 TABLET, FILM COATED ORAL
Qty: 30 | Refills: 0 | OUTPATIENT
Start: 2019-02-12

## 2019-02-12 RX ORDER — CLOPIDOGREL BISULFATE 75 MG/1
1 TABLET, FILM COATED ORAL
Qty: 90 | Refills: 0 | OUTPATIENT
Start: 2019-02-12

## 2019-02-12 RX ORDER — MAGNESIUM SULFATE 500 MG/ML
1 VIAL (ML) INJECTION ONCE
Qty: 0 | Refills: 0 | Status: COMPLETED | OUTPATIENT
Start: 2019-02-12 | End: 2019-02-12

## 2019-02-12 RX ORDER — ATORVASTATIN CALCIUM 80 MG/1
1 TABLET, FILM COATED ORAL
Qty: 0 | Refills: 0 | COMMUNITY

## 2019-02-12 RX ORDER — DULOXETINE HYDROCHLORIDE 30 MG/1
1 CAPSULE, DELAYED RELEASE ORAL
Qty: 0 | Refills: 0 | COMMUNITY

## 2019-02-12 RX ADMIN — CARVEDILOL PHOSPHATE 6.25 MILLIGRAM(S): 80 CAPSULE, EXTENDED RELEASE ORAL at 05:45

## 2019-02-12 RX ADMIN — Medication 20 MILLIGRAM(S): at 05:45

## 2019-02-12 RX ADMIN — INFLUENZA VIRUS VACCINE 0.5 MILLILITER(S): 15; 15; 15; 15 SUSPENSION INTRAMUSCULAR at 14:19

## 2019-02-12 RX ADMIN — Medication 81 MILLIGRAM(S): at 12:06

## 2019-02-12 RX ADMIN — CLOPIDOGREL BISULFATE 75 MILLIGRAM(S): 75 TABLET, FILM COATED ORAL at 12:06

## 2019-02-12 RX ADMIN — Medication 650 MILLIGRAM(S): at 00:33

## 2019-02-12 RX ADMIN — Medication 100 GRAM(S): at 12:07

## 2019-02-12 RX ADMIN — GABAPENTIN 200 MILLIGRAM(S): 400 CAPSULE ORAL at 12:06

## 2019-02-12 RX ADMIN — Medication 650 MILLIGRAM(S): at 00:03

## 2019-02-12 NOTE — PROGRESS NOTE ADULT - SUBJECTIVE AND OBJECTIVE BOX
PA note    Called by nurse that noted a small new hematoma, pressure was held x 10 minutes and the hematoma was reduced with small residual present. Patient denies pain good cap refill and distal pulse. no bleeding.

## 2019-02-12 NOTE — PROGRESS NOTE ADULT - ATTENDING COMMENTS
d/c IV fluids.
I have personally seen and examined patient.  Above note reviewed and discussed with NP, modified where appropriate.      Appearance: Normal	  HEENT:   Normal oral mucosa, PERRL, EOMI	  Lymphatic: No lymphadenopathy  Cardiovascular: Normal S1 S2, No JVD, No murmurs, No edema  Respiratory: Lungs clear to auscultation	  Psychiatry: A & O x 3, Mood & affect appropriate  Gastrointestinal:  Soft, Non-tender, + BS	  Skin: No rashes, No ecchymoses, No cyanosis  Neurologic: Non-focal  Extremities: Normal range of motion, No clubbing, cyanosis or edema  Vascular: Peripheral pulses palpable 2+ bilaterally  Procedure Site:  Left groin with bruising, no masses noted, non tender, no hematoma, +PP no edema.
Planned cath today;  Appropriately prehydrated  Will follow  Trend Anand Coyle

## 2019-02-12 NOTE — PROGRESS NOTE ADULT - ASSESSMENT
72 y/o female with pmhx of CAD s/p RONNY in 2008, SLE with nephropathy/CKD, depression, htn, breast and colon CA in remission since 1998 who presented to [facility] with progressive shortness of breath, chest pain, wheezing and headache.  Had LHC 2/8/2019 which showed Mid LAD 80% stenosis, yesterday went for stagged PCI and received a stent to LAD RONNY x 1 via left groin.  Left groin with bruising noted.  Will to a ultrasound, if negative may d,c and follow up outpatient.         Problem/Plan - 1:  ·  Problem: Acute systolic right heart failure.  Plan: Ct lasix 20 mg po bid. .       Problem/Plan - 2:  ·  Problem: NSTEMI (non-ST elevated myocardial infarction).  Plan: Patient with mid LAD disease.    DAPT:  Aspirin and plavix  Continue bb, statin.   Ultrasound left groin r.o hematoma/psuedu 74 y/o female with pmhx of CAD s/p RONNY in 2008, SLE with nephropathy/CKD, depression, htn, breast and colon CA in remission since 1998 who presented to [facility] with progressive shortness of breath, chest pain, wheezing and headache.  Had LHC 2/8/2019 which showed Mid LAD 80% stenosis, yesterday went for stagged PCI and received a stent to LAD RONNY x 1 via left groin.  Left groin with bruising noted.  Will to a ultrasound, if negative may d,c and follow up outpatient.         Problem/Plan - 1:  ·  Problem: Acute systolic right heart failure.  Plan: Ct lasix 20 mg po bid. Stable     Problem/Plan - 2:  ·  Problem: NSTEMI (non-ST elevated myocardial infarction).  Plan: Patient with mid LAD disease.    DAPT:  Aspirin and plavix  Continue bb, statin.   Ultrasound left groin r.o hematoma/psuedu     Problem/Plan - 3:  ·  Problem: HARDIK (acute kidney injury).  Stable     Problem/Plan - 3:  ·  Problem: Acute respiratory failure with hypoxia.  Resolved. 74 y/o female with pmhx of CAD s/p RONNY in 2008, SLE with nephropathy/CKD, depression, htn, breast and colon CA in remission since 1998 who presented to [facility] with progressive shortness of breath, chest pain, wheezing and headache.  Had LHC 2/8/2019 which showed Mid LAD 80% stenosis, yesterday went for stagged PCI and received a stent to LAD RONNY x 1 via left groin.  Left groin with bruising noted.  Will to a ultrasound, if negative may d,c and follow up outpatient.         Problem/Plan - 1:  ·  Problem: Acute systolic right heart failure.  Plan: Ct lasix 20 mg po bid. Stable     Problem/Plan - 2:  ·  Problem: NSTEMI (non-ST elevated myocardial infarction).  Plan: Patient with mid LAD disease.    DAPT:  Aspirin and plavix  Continue bb, statin.   Ultrasound left groin r.o hematoma/psuedu     Problem/Plan - 3:  ·  Problem: HARDIK (acute kidney injury).  Stable     Problem/Plan - 4:  ·  Problem: Acute respiratory failure with hypoxia.  Resolved. 72 y/o female with pmhx of CAD s/p RONNY in 2008, SLE with nephropathy/CKD, depression, htn, breast and colon CA in remission since 1998 who presented to [facility] with progressive shortness of breath, chest pain, wheezing and headache.  Had LHC 2/8/2019 which showed Mid LAD 80% stenosis, yesterday went for stagged PCI and received a stent to LAD RONNY x 1 via left groin.  Left groin with bruising noted.  Will to a ultrasound, if negative may d,c and follow up outpatient.    Ultrasound negative, EF 20-25% to be follow up by outpatient cardiologist in one week.       Problem/Plan - 1:  ·  Problem: Acute systolic right heart failure.  Plan: Ct Lasix 20 mg po bid. Stable     Problem/Plan - 2:  ·  Problem: NSTEMI (non-ST elevated myocardial infarction).  Plan: Patient with mid LAD disease.    DAPT:  Aspirin and plavix  Continue bb, statin.   Ultrasound left groin r.o hematoma/psuedu     Problem/Plan - 3:  ·  Problem: HARDIK (acute kidney injury).  Stable     Problem/Plan - 4:  ·  Problem: Acute respiratory failure with hypoxia.  Resolved. 74 y/o female with pmhx of CAD s/p RONNY in 2008, SLE with nephropathy/CKD, depression, htn, breast and colon CA in remission since 1998 who presented to [facility] with progressive shortness of breath, chest pain, wheezing and headache.  Had LHC 2/8/2019 which showed Mid LAD 80% stenosis, yesterday went for stagged PCI and received a stent to LAD RONNY x 1 via left groin.  Left groin with bruising noted.  Will to a ultrasound, if negative may d,c and follow up outpatient.    Ultrasound negative, EF 20-25% to be follow up by outpatient cardiologist in one week.       Problem/Plan - 1:  ·  Problem: Acute systolic right heart failure.  Plan: Ct Lasix 20 mg po bid. Stable     Problem/Plan - 2:  ·  Problem: NSTEMI (non-ST elevated myocardial infarction).  Plan: Patient with mid LAD disease.    DAPT:  Aspirin and plavix  Continue bb, statin.   Ultrasound left groin r.o hematoma/psuedu negative     Problem/Plan - 3:  ·  Problem: HARDIK (acute kidney injury).  Stable     Problem/Plan - 4:  ·  Problem: Acute respiratory failure with hypoxia.  Resolved.

## 2019-02-12 NOTE — PHYSICAL THERAPY INITIAL EVALUATION ADULT - PERTINENT HX OF CURRENT PROBLEM, REHAB EVAL
Pt is a 74 y/o female who presented from home with c/o SOB, CP. (+) NSTEMI, pericardial effusion, s/p PCO to mid LAD.

## 2019-02-12 NOTE — PROGRESS NOTE ADULT - SUBJECTIVE AND OBJECTIVE BOX
Reason for follow up: SLE GN, S/P PCI,                              Overnight: No new events.   Update:   72 y/o female with CAD s/p RONNY in , SLE with nephropathy/ CKD, HTN, breast and colon CA in remission since  who presented to [facility] with progressive shortness of breath, chest pain, wheezing and headache.      Had LHC 2019 which showed Mid LAD 80% stenosis, yesterday went for stagged PCI and received a stent to LAD RONNY x 1 via left groin.      Subjective: " Can I go home ? "    Complains of:  Nothing    Past medical history: No updates.     Chronic conditions:      Noninflammatory pericardial effusion  CKD (chronic kidney disease)  Other systemic lupus erythematosus with other organ involvement  Hypertension  Depression  Breast cancer  Colon cancer  Coronary artery disease  Acute systolic right heart failure  Acute respiratory failure with hypoxia  HARDIK (acute kidney injury)  Cardiomyopathy  NSTEMI (non-ST elevated myocardial infarction)  Coronary artery disease  Pericardial effusion    Cardiac cath  Patient s/p PCI to mid LAD with 1 RONNY.   Left femoral angioseal.     REVIEW OF SYSTEMS  General: Denies fever, chills, pain, no discomfort  Respiratory and Thorax:  Denies cough, sob, or any discomfort  Cardiovascular:  Denies chest pain, palpitations or any discomfort	  Gastrointestinal:  Denies n/v/d, constipation, or any discomfort  Genitourinary:  Denies frequency, burning, or pain  Musculoskeletal:  Denies joint pain, swelling, or any discomfort   Neurological:  Denies headache, dizziness blurred vision, numbing or tingling  Psychiatric:  Denies sadness or depression  Hematology  Raoul bleeding o swelling    MEDICATIONS:  carvedilol 6.25 milliGRAM(s) Oral every 12 hours  furosemide    Tablet 20 milliGRAM(s) Oral two times a day  ALBUTerol    0.083% 2.5 milliGRAM(s) Nebulizer every 4 hours PRN  ALBUTerol/ipratropium for Nebulization 3 milliLiter(s) Nebulizer every 6 hours PRN  acetaminophen   Tablet .. 650 milliGRAM(s) Oral every 6 hours PRN  baclofen 10 milliGRAM(s) Oral three times a day PRN  gabapentin 200 milliGRAM(s) Oral daily  nortriptyline 10 milliGRAM(s) Oral at bedtime  atorvastatin 80 milliGRAM(s) Oral at bedtime  aspirin  chewable 81 milliGRAM(s) Oral daily  clopidogrel Tablet 75 milliGRAM(s) Oral daily  influenza   Vaccine 0.5 milliLiter(s) IntraMuscular once  magnesium sulfate  IVPB 1 Gram(s) IV Intermittent once    PHYSICAL EXAM:  T(C): 37 (19 @ 05:00), Max: 37 (19 @ 05:00)  HR: 75 (19 @ 05:00) (75 - 99)  BP: 117/58 (19 @ 05:00) (100/60 - 178/62)  RR: 16 (19 @ 05:00) (13 - 20)  SpO2: 97% (19 @ 05:00) (93% - 97%)  I&O's Summary  2019 07:01  -  2019 07:00  --------------------------------------------------------  IN: 240 mL / OUT: 300 mL / NET: -60 mL  Daily Weight in k.5 (2019 05:16)    Appearance: Normal , Pale,	  HEENT:   Normal oral mucosa, PERRL, EOMI	  Lymphatic: No lymphadenopathy  Cardiovascular: Normal S1 S2, No JVD, No murmurs, No edema  Respiratory: Lungs clear to auscultation	  Psychiatry: A & O x 3, Mood & affect appropriate  Gastrointestinal:  Soft, Non-tender, + BS	  Skin: No rashes, No ecchymoses, No cyanosis  Neurologic: Non-focal  Extremities: Normal range of motion, No clubbing, cyanosis or edema  Vascular: Peripheral pulses palpable 2+ bilaterally  Procedure Site:  Left groin with bruising, no masses noted, non tender, no hematoma, +PP no edema.        TELEMETRY: 	  SR 73, no acute changes noted.    ECG:  	SR, 70's t waves noted v4-6, consistent with prior ekg.  No acute changes noted.                         9.8    13.5  )-----------( 282      ( 2019 06:17 )            30.5   37  |  102  |  41.0<H>  ----------------------------<  128<H>  4.2   |  23.0  |  1.98<H>  Ca    9.3      2019 06:17  Phos  4.2     -  Mg     1.8     -      Assessment and Plan:       72 y/o female with pmhx of CAD s/p RONNY in , SLE with nephropathy/CKD, depression, htn, breast and colon CA in remission since  who presented to [facility] with progressive shortness of breath, chest pain, wheezing and headache.  Had LHC 2019 which showed Mid LAD 80% stenosis, yesterday went for stagged PCI and received a stent to LAD RONNY x 1 via left groin.  Left groin with bruising noted.  Will to a ultrasound, if negative may d,c and follow up outpatient.         Problem/Plan - 1:  ·  Problem: Acute systolic right heart failure.      Plan: On Diuretics, Stable     Problem/Plan - 2:  ·  Problem: NSTEMI (non-ST elevated myocardial infarction).      Plan: Patient with mid LAD disease.         Problem/Plan - 3:  ·  Problem: HARDIK (acute kidney injury) , No Progression,       Op F.U When Discharged,    TY,

## 2019-02-12 NOTE — PROGRESS NOTE ADULT - REASON FOR ADMISSION
transfer from [place] for possible pericardiocentesis
CHF
transfer from [place] for possible pericardiocentesis
transfer from [place] for possible pericardiocentesis
transfer from [place] for possible pericardiocentesis
transfer from [place] for possible pericardiocentesis
transfer from [place] for possible pericardiocentesis
transfer from [place] for possible pericardiocentesis
transfer from [place] for possible pericardiocentesis
transfer from [place] for possible pericardiocentesis
transfer from [place] for possible pericardiocentesis
transfer from [place] for possible pericardiocentesis
transfer from [place] for possible pericardiocentesis
transfer from [place] for possible pericardiocentesis
transfer from [place] for possible pericardiocentesis
transfer from [place] for possible pericardiocentesis
transfer from [place] for possible pericardiocentesis
transfer from [place] for possible pericardiocentesis
transfer from [place] for possible pericardiocentesis
transfer from [place] for possible pericardiocentesis
transfer from [place] for possible pericardiocentesis
transfer from [place] for possible pericardiocentesis
transfer from [place] for possible pericardiocentesis
transfer from [place] for possible pericardiocentesis

## 2019-02-12 NOTE — PROGRESS NOTE ADULT - NSHPATTENDINGPLANDISCUSS_GEN_ALL_CORE
the patient.  All imaging and results of lab/other studies reviewed by me. All questions answered to the satisfaction of the patient. At this time they agree with the current plan of therapy.
the patient, rn.  All imaging and results of lab/other studies reviewed by me. All questions answered to the satisfaction of the patient. At this time they agree with the current plan of therapy.
the patient.  All imaging and results of lab/other studies reviewed by me. All questions answered to the satisfaction of the patient. At this time they agree with the current plan of therapy.
pt, NP, pn

## 2019-09-05 PROBLEM — M32.19 OTHER ORGAN OR SYSTEM INVOLVEMENT IN SYSTEMIC LUPUS ERYTHEMATOSUS: Chronic | Status: ACTIVE | Noted: 2019-02-06

## 2019-09-05 PROBLEM — I10 ESSENTIAL (PRIMARY) HYPERTENSION: Chronic | Status: ACTIVE | Noted: 2019-02-06

## 2019-09-05 PROBLEM — C18.9 MALIGNANT NEOPLASM OF COLON, UNSPECIFIED: Chronic | Status: ACTIVE | Noted: 2019-02-06

## 2019-09-05 PROBLEM — C50.919 MALIGNANT NEOPLASM OF UNSPECIFIED SITE OF UNSPECIFIED FEMALE BREAST: Chronic | Status: ACTIVE | Noted: 2019-02-06

## 2019-09-05 PROBLEM — I25.10 ATHEROSCLEROTIC HEART DISEASE OF NATIVE CORONARY ARTERY WITHOUT ANGINA PECTORIS: Chronic | Status: ACTIVE | Noted: 2019-02-06

## 2019-09-05 PROBLEM — F32.9 MAJOR DEPRESSIVE DISORDER, SINGLE EPISODE, UNSPECIFIED: Chronic | Status: ACTIVE | Noted: 2019-02-06

## 2019-09-05 PROBLEM — N18.9 CHRONIC KIDNEY DISEASE, UNSPECIFIED: Chronic | Status: ACTIVE | Noted: 2019-02-06

## 2019-09-06 ENCOUNTER — APPOINTMENT (OUTPATIENT)
Dept: RADIOLOGY | Facility: CLINIC | Age: 74
End: 2019-09-06
Payer: MEDICARE

## 2019-09-06 PROCEDURE — 74018 RADEX ABDOMEN 1 VIEW: CPT

## 2019-09-13 NOTE — PROGRESS NOTE ADULT - SUBJECTIVE AND OBJECTIVE BOX
Medications: NP assessment:    72 y/o female with pmhx of CAD s/p RONNY in , SLE with nephropathy/CKD, depression, htn, breast and colon CA in remission since  who [place] to [facility] [place] shortness of breath, chest pain, wheezing and headache.      PMH  Noninflammatory pericardial effusion  CKD (chronic kidney disease)  Other systemic lupus erythematosus with other organ involvement  Hypertension  Depression  Breast cancer  Colon cancer  Coronary artery disease  Acute systolic right heart failure  Acute respiratory failure with hypoxia  HARDIK (acute kidney injury)  Cardiomyopathy  NSTEMI (non-ST elevated myocardial infarction)  Coronary artery disease  Pericardial effusion      REVIEW OF SYSTEMS  General:	Denies fever, chills, pain, no discomfort  Respiratory and Thorax:  Denies cough, sob, or any discomfort  Cardiovascular:  Denies chest pain, palpitations or any discomfort	  Gastrointestinal:  Denies n/v/d, constipation, or any discomfort  Genitourinary:  Denies frequency, burning, or pain  Musculoskeletal:  Denies joint pain, swelling, or any discomfort   Neurological:  Denies headache, dizziness blurred vision, numbing or tingling  Psychiatric:  Denies sadness or depression  Hematology  Raoul bleeding o swelling    MEDICATIONS:  carvedilol 6.25 milliGRAM(s) Oral every 12 hours  furosemide    Tablet 20 milliGRAM(s) Oral two times a day  ALBUTerol    0.083% 2.5 milliGRAM(s) Nebulizer every 4 hours PRN  ALBUTerol/ipratropium for Nebulization 3 milliLiter(s) Nebulizer every 6 hours PRN  acetaminophen   Tablet .. 650 milliGRAM(s) Oral every 6 hours PRN  baclofen 10 milliGRAM(s) Oral three times a day PRN  gabapentin 200 milliGRAM(s) Oral daily  nortriptyline 10 milliGRAM(s) Oral at bedtime  atorvastatin 80 milliGRAM(s) Oral at bedtime  aspirin  chewable 81 milliGRAM(s) Oral daily  clopidogrel Tablet 75 milliGRAM(s) Oral daily  influenza   Vaccine 0.5 milliLiter(s) IntraMuscular once  magnesium sulfate  IVPB 1 Gram(s) IV Intermittent once    PHYSICAL EXAM:  T(C): 37 (19 @ 05:00), Max: 37 (19 @ 05:00)  HR: 75 (19 @ 05:00) (75 - 99)  BP: 117/58 (19 @ 05:00) (100/60 - 178/62)  RR: 16 (19 @ 05:00) (13 - 20)  SpO2: 97% (19 @ 05:00) (93% - 97%)  I&O's Summary  2019 07:01  -  2019 07:00  --------------------------------------------------------  IN: 240 mL / OUT: 300 mL / NET: -60 mL  Daily Weight in k.5 (2019 05:16)  Appearance: Normal	  HEENT:   Normal oral mucosa, PERRL, EOMI	  Lymphatic: No lymphadenopathy  Cardiovascular: Normal S1 S2, No JVD, No murmurs, No edema  Respiratory: Lungs clear to auscultation	  Psychiatry: A & O x 3, Mood & affect appropriate  Gastrointestinal:  Soft, Non-tender, + BS	  Skin: No rashes, No ecchymoses, No cyanosis  Neurologic: Non-focal  Extremities: Normal range of motion, No clubbing, cyanosis or edema  Vascular: Peripheral pulses palpable 2+ bilaterally  Procedure Site:      TELEMETRY: 	  SR 73, no acute changes noted.    ECG:  	SR, 70's t waves noted v4-6, consistent with prior ekg.  No acute changes noted.                         9.8    13.5  )-----------( 282      ( 2019 06:17 )            30.5   37  |  102  |  41.0<H>  ----------------------------<  128<H>  4.2   |  23.0  |  1.98<H>  Ca    9.3      2019 06:17  Phos  4.2     02  Mg     1.8      NP assessment:    72 y/o female with pmhx of CAD s/p RONNY in , SLE with nephropathy/CKD, depression, htn, breast and colon CA in remission since  who presented to [facility] [place] with progressive shortness of breath, chest pain, wheezing and headache.  Had LHC 2019 which showed Mid LAD 80% stenosis, yesterday went for stagged PCI and received a stent to LAD RONNY x 1 via left groin.  Left groin with bruising noted.  Will to a ultrasound, if negative may d,c and follow up outpatient.        Cardiac cath  Patient s/p PCI to mid LAD with 1 RONNY.   Left femoral angioseal.     PMH  Noninflammatory pericardial effusion  CKD (chronic kidney disease)  Other systemic lupus erythematosus with other organ involvement  Hypertension  Depression  Breast cancer  Colon cancer  Coronary artery disease  Acute systolic right heart failure  Acute respiratory failure with hypoxia  HARDIK (acute kidney injury)  Cardiomyopathy  NSTEMI (non-ST elevated myocardial infarction)  Coronary artery disease  Pericardial effusion    REVIEW OF SYSTEMS  General:	Denies fever, chills, pain, no discomfort  Respiratory and Thorax:  Denies cough, sob, or any discomfort  Cardiovascular:  Denies chest pain, palpitations or any discomfort	  Gastrointestinal:  Denies n/v/d, constipation, or any discomfort  Genitourinary:  Denies frequency, burning, or pain  Musculoskeletal:  Denies joint pain, swelling, or any discomfort   Neurological:  Denies headache, dizziness blurred vision, numbing or tingling  Psychiatric:  Denies sadness or depression  Hematology  Raoul bleeding o swelling    MEDICATIONS:  carvedilol 6.25 milliGRAM(s) Oral every 12 hours  furosemide    Tablet 20 milliGRAM(s) Oral two times a day  ALBUTerol    0.083% 2.5 milliGRAM(s) Nebulizer every 4 hours PRN  ALBUTerol/ipratropium for Nebulization 3 milliLiter(s) Nebulizer every 6 hours PRN  acetaminophen   Tablet .. 650 milliGRAM(s) Oral every 6 hours PRN  baclofen 10 milliGRAM(s) Oral three times a day PRN  gabapentin 200 milliGRAM(s) Oral daily  nortriptyline 10 milliGRAM(s) Oral at bedtime  atorvastatin 80 milliGRAM(s) Oral at bedtime  aspirin  chewable 81 milliGRAM(s) Oral daily  clopidogrel Tablet 75 milliGRAM(s) Oral daily  influenza   Vaccine 0.5 milliLiter(s) IntraMuscular once  magnesium sulfate  IVPB 1 Gram(s) IV Intermittent once    PHYSICAL EXAM:  T(C): 37 (19 @ 05:00), Max: 37 (19 @ 05:00)  HR: 75 (19 @ 05:00) (75 - 99)  BP: 117/58 (19 @ 05:00) (100/60 - 178/62)  RR: 16 (19 @ 05:00) (13 - 20)  SpO2: 97% (19 @ 05:00) (93% - 97%)  I&O's Summary  2019 07:01  -  2019 07:00  --------------------------------------------------------  IN: 240 mL / OUT: 300 mL / NET: -60 mL  Daily Weight in k.5 (2019 05:16)  Appearance: Normal	  HEENT:   Normal oral mucosa, PERRL, EOMI	  Lymphatic: No lymphadenopathy  Cardiovascular: Normal S1 S2, No JVD, No murmurs, No edema  Respiratory: Lungs clear to auscultation	  Psychiatry: A & O x 3, Mood & affect appropriate  Gastrointestinal:  Soft, Non-tender, + BS	  Skin: No rashes, No ecchymoses, No cyanosis  Neurologic: Non-focal  Extremities: Normal range of motion, No clubbing, cyanosis or edema  Vascular: Peripheral pulses palpable 2+ bilaterally  Procedure Site:      TELEMETRY: 	  SR 73, no acute changes noted.    ECG:  	SR, 70's t waves noted v4-6, consistent with prior ekg.  No acute changes noted.                         9.8    13.5  )-----------( 282      ( 2019 06:17 )            30.5   37  |  102  |  41.0<H>  ----------------------------<  128<H>  4.2   |  23.0  |  1.98<H>  Ca    9.3      2019 06:17  Phos  4.2     -  Mg     1.8      PROGRESS NOTE   Reason for follow up:  SOB                            Overnight: No new events.   Update:   72 y/o female with pmhx of CAD s/p RONNY in , SLE with nephropathy/CKD, depression, htn, breast and colon CA in remission since  who presented to [facility] [place] with progressive shortness of breath, chest pain, wheezing and headache.  Had C 2019 which showed Mid LAD 80% stenosis, yesterday went for stagged PCI and received a stent to LAD RONNY x 1 via left groin.  Left groin with bruising noted.  Will to a ultrasound, if negative may d,c and follow up outpatient.    Subjective: " Can I go home_"   Complains of:  Nothing    Past medical history: No updates.     Chronic conditions:     Noninflammatory pericardial effusion  CKD (chronic kidney disease)  Other systemic lupus erythematosus with other organ involvement  Hypertension  Depression  Breast cancer  Colon cancer  Coronary artery disease  Acute systolic right heart failure  Acute respiratory failure with hypoxia  HARDIK (acute kidney injury)  Cardiomyopathy  NSTEMI (non-ST elevated myocardial infarction)  Coronary artery disease  Pericardial effusion    Cardiac cath  Patient s/p PCI to mid LAD with 1 RONNY.   Left femoral angioseal.     REVIEW OF SYSTEMS  General: Denies fever, chills, pain, no discomfort  Respiratory and Thorax:  Denies cough, sob, or any discomfort  Cardiovascular:  Denies chest pain, palpitations or any discomfort	  Gastrointestinal:  Denies n/v/d, constipation, or any discomfort  Genitourinary:  Denies frequency, burning, or pain  Musculoskeletal:  Denies joint pain, swelling, or any discomfort   Neurological:  Denies headache, dizziness blurred vision, numbing or tingling  Psychiatric:  Denies sadness or depression  Hematology  Raoul bleeding o swelling    MEDICATIONS:  carvedilol 6.25 milliGRAM(s) Oral every 12 hours  furosemide    Tablet 20 milliGRAM(s) Oral two times a day  ALBUTerol    0.083% 2.5 milliGRAM(s) Nebulizer every 4 hours PRN  ALBUTerol/ipratropium for Nebulization 3 milliLiter(s) Nebulizer every 6 hours PRN  acetaminophen   Tablet .. 650 milliGRAM(s) Oral every 6 hours PRN  baclofen 10 milliGRAM(s) Oral three times a day PRN  gabapentin 200 milliGRAM(s) Oral daily  nortriptyline 10 milliGRAM(s) Oral at bedtime  atorvastatin 80 milliGRAM(s) Oral at bedtime  aspirin  chewable 81 milliGRAM(s) Oral daily  clopidogrel Tablet 75 milliGRAM(s) Oral daily  influenza   Vaccine 0.5 milliLiter(s) IntraMuscular once  magnesium sulfate  IVPB 1 Gram(s) IV Intermittent once    PHYSICAL EXAM:  T(C): 37 (19 @ 05:00), Max: 37 (19 @ 05:00)  HR: 75 (19 @ 05:00) (75 - 99)  BP: 117/58 (19 @ 05:00) (100/60 - 178/62)  RR: 16 (19 @ 05:00) (13 - 20)  SpO2: 97% (19 @ 05:00) (93% - 97%)  I&O's Summary  2019 07:01  -  2019 07:00  --------------------------------------------------------  IN: 240 mL / OUT: 300 mL / NET: -60 mL  Daily Weight in k.5 (2019 05:16)  Appearance: Normal	  HEENT:   Normal oral mucosa, PERRL, EOMI	  Lymphatic: No lymphadenopathy  Cardiovascular: Normal S1 S2, No JVD, No murmurs, No edema  Respiratory: Lungs clear to auscultation	  Psychiatry: A & O x 3, Mood & affect appropriate  Gastrointestinal:  Soft, Non-tender, + BS	  Skin: No rashes, No ecchymoses, No cyanosis  Neurologic: Non-focal  Extremities: Normal range of motion, No clubbing, cyanosis or edema  Vascular: Peripheral pulses palpable 2+ bilaterally  Procedure Site:  Left groin with bruising, no masses noted, non tender, no hematoma, +PP no edema.        TELEMETRY: 	  SR 73, no acute changes noted.    ECG:  	SR, 70's t waves noted v4-6, consistent with prior ekg.  No acute changes noted.                         9.8    13.5  )-----------( 282      ( 2019 06:17 )            30.5   37  |  102  |  41.0<H>  ----------------------------<  128<H>  4.2   |  23.0  |  1.98<H>  Ca    9.3      2019 06:17  Phos  4.2     02-12  Mg     1.8     12 PROGRESS NOTE   Reason for follow up:  SOB                            Overnight: No new events.   Update:   74 y/o female with pmhx of CAD s/p RONNY in , SLE with nephropathy/CKD, depression, htn, breast and colon CA in remission since  who presented to [facility] [place] with progressive shortness of breath, chest pain, wheezing and headache.  Had C 2019 which showed Mid LAD 80% stenosis, yesterday went for stagged PCI and received a stent to LAD RONNY x 1 via left groin.  Left groin with bruising noted.  Will to a ultrasound, if negative may d,c and follow up outpatient.      Subjective: " Can I go home_"   Complains of:  Nothing    Past medical history: No updates.     Chronic conditions:     Noninflammatory pericardial effusion  CKD (chronic kidney disease)  Other systemic lupus erythematosus with other organ involvement  Hypertension  Depression  Breast cancer  Colon cancer  Coronary artery disease  Acute systolic right heart failure  Acute respiratory failure with hypoxia  HARDIK (acute kidney injury)  Cardiomyopathy  NSTEMI (non-ST elevated myocardial infarction)  Coronary artery disease  Pericardial effusion    Cardiac cath  Patient s/p PCI to mid LAD with 1 RONNY.   Left femoral angioseal.     REVIEW OF SYSTEMS  General: Denies fever, chills, pain, no discomfort  Respiratory and Thorax:  Denies cough, sob, or any discomfort  Cardiovascular:  Denies chest pain, palpitations or any discomfort	  Gastrointestinal:  Denies n/v/d, constipation, or any discomfort  Genitourinary:  Denies frequency, burning, or pain  Musculoskeletal:  Denies joint pain, swelling, or any discomfort   Neurological:  Denies headache, dizziness blurred vision, numbing or tingling  Psychiatric:  Denies sadness or depression  Hematology  Raoul bleeding o swelling    MEDICATIONS:  carvedilol 6.25 milliGRAM(s) Oral every 12 hours  furosemide    Tablet 20 milliGRAM(s) Oral two times a day  ALBUTerol    0.083% 2.5 milliGRAM(s) Nebulizer every 4 hours PRN  ALBUTerol/ipratropium for Nebulization 3 milliLiter(s) Nebulizer every 6 hours PRN  acetaminophen   Tablet .. 650 milliGRAM(s) Oral every 6 hours PRN  baclofen 10 milliGRAM(s) Oral three times a day PRN  gabapentin 200 milliGRAM(s) Oral daily  nortriptyline 10 milliGRAM(s) Oral at bedtime  atorvastatin 80 milliGRAM(s) Oral at bedtime  aspirin  chewable 81 milliGRAM(s) Oral daily  clopidogrel Tablet 75 milliGRAM(s) Oral daily  influenza   Vaccine 0.5 milliLiter(s) IntraMuscular once  magnesium sulfate  IVPB 1 Gram(s) IV Intermittent once    PHYSICAL EXAM:  T(C): 37 (19 @ 05:00), Max: 37 (19 @ 05:00)  HR: 75 (19 @ 05:00) (75 - 99)  BP: 117/58 (19 @ 05:00) (100/60 - 178/62)  RR: 16 (19 @ 05:00) (13 - 20)  SpO2: 97% (19 @ 05:00) (93% - 97%)  I&O's Summary  2019 07:01  -  2019 07:00  --------------------------------------------------------  IN: 240 mL / OUT: 300 mL / NET: -60 mL  Daily Weight in k.5 (2019 05:16)  Appearance: Normal	  HEENT:   Normal oral mucosa, PERRL, EOMI	  Lymphatic: No lymphadenopathy  Cardiovascular: Normal S1 S2, No JVD, No murmurs, No edema  Respiratory: Lungs clear to auscultation	  Psychiatry: A & O x 3, Mood & affect appropriate  Gastrointestinal:  Soft, Non-tender, + BS	  Skin: No rashes, No ecchymoses, No cyanosis  Neurologic: Non-focal  Extremities: Normal range of motion, No clubbing, cyanosis or edema  Vascular: Peripheral pulses palpable 2+ bilaterally  Procedure Site:  Left groin with bruising, no masses noted, non tender, no hematoma, +PP no edema.        TELEMETRY: 	  SR 73, no acute changes noted.    ECG:  	SR, 70's t waves noted v4-6, consistent with prior ekg.  No acute changes noted.                         9.8    13.5  )-----------( 282      ( 2019 06:17 )            30.5   37  |  102  |  41.0<H>  ----------------------------<  128<H>  4.2   |  23.0  |  1.98<H>  Ca    9.3      2019 06:17  Phos  4.2     02-12  Mg     1.8     12

## 2019-11-16 ENCOUNTER — OUTPATIENT (OUTPATIENT)
Dept: OUTPATIENT SERVICES | Facility: HOSPITAL | Age: 74
LOS: 1 days | End: 2019-11-16

## 2019-11-16 ENCOUNTER — INPATIENT (INPATIENT)
Facility: HOSPITAL | Age: 74
LOS: 1 days | Discharge: HOME | End: 2019-11-18
Payer: MEDICARE

## 2019-11-16 PROCEDURE — 99285 EMERGENCY DEPT VISIT HI MDM: CPT

## 2019-11-16 PROCEDURE — 70450 CT HEAD/BRAIN W/O DYE: CPT | Mod: 26

## 2019-11-16 PROCEDURE — 71046 X-RAY EXAM CHEST 2 VIEWS: CPT | Mod: 26

## 2019-11-17 ENCOUNTER — OUTPATIENT (OUTPATIENT)
Dept: OUTPATIENT SERVICES | Facility: HOSPITAL | Age: 74
LOS: 1 days | End: 2019-11-17

## 2019-11-17 PROCEDURE — 93010 ELECTROCARDIOGRAM REPORT: CPT

## 2019-11-18 ENCOUNTER — OUTPATIENT (OUTPATIENT)
Dept: OUTPATIENT SERVICES | Facility: HOSPITAL | Age: 74
LOS: 1 days | End: 2019-11-18

## 2019-11-18 PROCEDURE — 70551 MRI BRAIN STEM W/O DYE: CPT | Mod: 26

## 2020-02-04 ENCOUNTER — EMERGENCY (EMERGENCY)
Facility: HOSPITAL | Age: 75
LOS: 1 days | End: 2020-02-04
Admitting: EMERGENCY MEDICINE
Payer: MEDICARE

## 2020-02-04 PROCEDURE — 99285 EMERGENCY DEPT VISIT HI MDM: CPT | Mod: GC

## 2020-02-04 PROCEDURE — 71046 X-RAY EXAM CHEST 2 VIEWS: CPT | Mod: 26

## 2020-06-10 ENCOUNTER — APPOINTMENT (OUTPATIENT)
Dept: RADIOLOGY | Facility: CLINIC | Age: 75
End: 2020-06-10
Payer: MEDICARE

## 2020-06-10 PROCEDURE — 74240 X-RAY XM UPR GI TRC 1CNTRST: CPT

## 2020-10-28 ENCOUNTER — APPOINTMENT (OUTPATIENT)
Dept: RADIOLOGY | Facility: CLINIC | Age: 75
End: 2020-10-28
Payer: MEDICARE

## 2020-10-28 PROCEDURE — 77080 DXA BONE DENSITY AXIAL: CPT

## 2020-12-25 ENCOUNTER — EMERGENCY (EMERGENCY)
Facility: HOSPITAL | Age: 75
LOS: 1 days | End: 2020-12-25
Admitting: EMERGENCY MEDICINE
Payer: MEDICARE

## 2020-12-25 PROCEDURE — 71101 X-RAY EXAM UNILAT RIBS/CHEST: CPT | Mod: 26,LT

## 2020-12-25 PROCEDURE — 99283 EMERGENCY DEPT VISIT LOW MDM: CPT

## 2020-12-29 ENCOUNTER — OUTPATIENT (OUTPATIENT)
Dept: OUTPATIENT SERVICES | Facility: HOSPITAL | Age: 75
LOS: 1 days | End: 2020-12-29

## 2020-12-29 ENCOUNTER — EMERGENCY (EMERGENCY)
Facility: HOSPITAL | Age: 75
LOS: 1 days | End: 2020-12-29
Payer: MEDICARE

## 2020-12-29 PROCEDURE — 70450 CT HEAD/BRAIN W/O DYE: CPT | Mod: 26

## 2020-12-29 PROCEDURE — 99285 EMERGENCY DEPT VISIT HI MDM: CPT | Mod: CS

## 2021-06-20 ENCOUNTER — TRANSCRIPTION ENCOUNTER (OUTPATIENT)
Age: 76
End: 2021-06-20

## 2022-03-16 ENCOUNTER — APPOINTMENT (OUTPATIENT)
Dept: ULTRASOUND IMAGING | Facility: CLINIC | Age: 77
End: 2022-03-16
Payer: MEDICARE

## 2022-03-16 PROCEDURE — 76882 US LMTD JT/FCL EVL NVASC XTR: CPT | Mod: LT

## 2022-03-21 ENCOUNTER — APPOINTMENT (OUTPATIENT)
Dept: MRI IMAGING | Facility: CLINIC | Age: 77
End: 2022-03-21
Payer: MEDICARE

## 2022-03-21 PROCEDURE — 72148 MRI LUMBAR SPINE W/O DYE: CPT | Mod: MH

## 2022-10-23 ENCOUNTER — NON-APPOINTMENT (OUTPATIENT)
Age: 77
End: 2022-10-23

## 2023-01-31 ENCOUNTER — TRANSCRIPTION ENCOUNTER (OUTPATIENT)
Age: 78
End: 2023-01-31

## 2023-02-03 ENCOUNTER — NON-APPOINTMENT (OUTPATIENT)
Age: 78
End: 2023-02-03

## 2023-02-03 ENCOUNTER — APPOINTMENT (OUTPATIENT)
Dept: CARE COORDINATION | Facility: HOME HEALTH | Age: 78
End: 2023-02-03

## 2023-02-03 DIAGNOSIS — J21.9 ACUTE BRONCHIOLITIS, UNSPECIFIED: ICD-10-CM

## 2023-02-03 DIAGNOSIS — Z85.3 PERSONAL HISTORY OF MALIGNANT NEOPLASM OF BREAST: ICD-10-CM

## 2023-02-03 DIAGNOSIS — Z86.79 PERSONAL HISTORY OF OTHER DISEASES OF THE CIRCULATORY SYSTEM: ICD-10-CM

## 2023-02-03 DIAGNOSIS — Z85.038 PERSONAL HISTORY OF OTHER MALIGNANT NEOPLASM OF LARGE INTESTINE: ICD-10-CM

## 2023-02-03 DIAGNOSIS — Z99.2 END STAGE RENAL DISEASE: ICD-10-CM

## 2023-02-03 DIAGNOSIS — J10.1 INFLUENZA DUE TO OTHER IDENTIFIED INFLUENZA VIRUS WITH OTHER RESPIRATORY MANIFESTATIONS: ICD-10-CM

## 2023-02-03 DIAGNOSIS — N18.6 END STAGE RENAL DISEASE: ICD-10-CM

## 2023-02-03 DIAGNOSIS — M32.9 SYSTEMIC LUPUS ERYTHEMATOSUS, UNSPECIFIED: ICD-10-CM

## 2023-02-03 RX ORDER — FERRIC CITRATE 210 MG/1
1 GM TABLET, COATED ORAL
Refills: 0 | Status: ACTIVE | COMMUNITY

## 2023-02-03 RX ORDER — AMLODIPINE BESYLATE 5 MG/1
5 TABLET ORAL
Qty: 30 | Refills: 0 | Status: ACTIVE | COMMUNITY

## 2023-02-03 RX ORDER — FAMOTIDINE 20 MG/1
20 TABLET, FILM COATED ORAL DAILY
Qty: 30 | Refills: 0 | Status: ACTIVE | COMMUNITY

## 2023-02-03 RX ORDER — MEDICAL SUPPLY, MISCELLANEOUS
EACH MISCELLANEOUS
Qty: 4 | Refills: 0 | Status: ACTIVE | COMMUNITY
Start: 2023-02-03 | End: 1900-01-01

## 2023-02-03 RX ORDER — ASPIRIN ENTERIC COATED TABLETS 81 MG 81 MG/1
81 TABLET, DELAYED RELEASE ORAL DAILY
Refills: 0 | Status: ACTIVE | COMMUNITY

## 2023-02-03 RX ORDER — METOPROLOL SUCCINATE 25 MG/1
25 TABLET, EXTENDED RELEASE ORAL DAILY
Qty: 30 | Refills: 0 | Status: ACTIVE | COMMUNITY

## 2023-02-03 RX ORDER — FLUOCINOLONE ACETONIDE 0.1 MG/G
0.01 CREAM TOPICAL TWICE DAILY
Refills: 0 | Status: ACTIVE | COMMUNITY

## 2023-02-03 RX ORDER — ATORVASTATIN CALCIUM 80 MG/1
80 TABLET, FILM COATED ORAL
Qty: 30 | Refills: 0 | Status: ACTIVE | COMMUNITY

## 2023-02-03 RX ORDER — MONTELUKAST 10 MG/1
10 TABLET, FILM COATED ORAL
Qty: 30 | Refills: 0 | Status: ACTIVE | COMMUNITY

## 2023-02-03 RX ORDER — FLUTICASONE PROPIONATE 50 UG/1
50 SPRAY, METERED NASAL DAILY
Qty: 1 | Refills: 0 | Status: ACTIVE | COMMUNITY

## 2023-02-03 RX ORDER — GABAPENTIN 300 MG/1
300 CAPSULE ORAL 3 TIMES DAILY
Refills: 0 | Status: ACTIVE | COMMUNITY

## 2023-02-03 RX ORDER — ALBUTEROL SULFATE 90 UG/1
108 (90 BASE) AEROSOL, METERED RESPIRATORY (INHALATION)
Refills: 0 | Status: ACTIVE | COMMUNITY

## 2023-02-03 RX ORDER — FOLIC ACID/VIT B COMPLEX AND C 0.8 MG
0.8 TABLET ORAL
Refills: 0 | Status: ACTIVE | COMMUNITY

## 2023-02-10 ENCOUNTER — TRANSCRIPTION ENCOUNTER (OUTPATIENT)
Age: 78
End: 2023-02-10

## 2023-02-14 ENCOUNTER — TRANSCRIPTION ENCOUNTER (OUTPATIENT)
Age: 78
End: 2023-02-14

## 2023-02-21 ENCOUNTER — TRANSCRIPTION ENCOUNTER (OUTPATIENT)
Age: 78
End: 2023-02-21

## 2023-11-06 ENCOUNTER — NON-APPOINTMENT (OUTPATIENT)
Age: 78
End: 2023-11-06

## 2023-11-06 ENCOUNTER — APPOINTMENT (OUTPATIENT)
Dept: OPHTHALMOLOGY | Facility: CLINIC | Age: 78
End: 2023-11-06
Payer: MEDICARE

## 2023-11-06 PROCEDURE — 99203 OFFICE O/P NEW LOW 30 MIN: CPT

## 2023-12-11 ENCOUNTER — NON-APPOINTMENT (OUTPATIENT)
Age: 78
End: 2023-12-11

## 2023-12-11 ENCOUNTER — APPOINTMENT (OUTPATIENT)
Dept: OPHTHALMOLOGY | Facility: CLINIC | Age: 78
End: 2023-12-11
Payer: MEDICARE

## 2023-12-11 PROCEDURE — 92134 CPTRZ OPH DX IMG PST SGM RTA: CPT

## 2023-12-11 PROCEDURE — 92014 COMPRE OPH EXAM EST PT 1/>: CPT

## 2024-04-08 ENCOUNTER — NON-APPOINTMENT (OUTPATIENT)
Age: 79
End: 2024-04-08

## 2024-04-10 ENCOUNTER — APPOINTMENT (OUTPATIENT)
Dept: OPHTHALMOLOGY | Facility: CLINIC | Age: 79
End: 2024-04-10

## 2024-05-10 ENCOUNTER — RESULT REVIEW (OUTPATIENT)
Age: 79
End: 2024-05-10

## 2024-05-13 ENCOUNTER — APPOINTMENT (OUTPATIENT)
Dept: OPHTHALMOLOGY | Facility: CLINIC | Age: 79
End: 2024-05-13

## 2024-05-14 ENCOUNTER — TRANSCRIPTION ENCOUNTER (OUTPATIENT)
Age: 79
End: 2024-05-14

## 2024-05-28 ENCOUNTER — TRANSCRIPTION ENCOUNTER (OUTPATIENT)
Age: 79
End: 2024-05-28

## 2024-07-16 ENCOUNTER — NON-APPOINTMENT (OUTPATIENT)
Age: 79
End: 2024-07-16

## 2024-09-05 ENCOUNTER — OFFICE (OUTPATIENT)
Dept: URBAN - METROPOLITAN AREA CLINIC 38 | Facility: CLINIC | Age: 79
Setting detail: OPHTHALMOLOGY
End: 2024-09-05
Payer: MEDICARE

## 2024-09-05 ENCOUNTER — RX ONLY (RX ONLY)
Age: 79
End: 2024-09-05

## 2024-09-05 DIAGNOSIS — Z96.1: ICD-10-CM

## 2024-09-05 DIAGNOSIS — H43.811: ICD-10-CM

## 2024-09-05 DIAGNOSIS — H35.3132: ICD-10-CM

## 2024-09-05 DIAGNOSIS — H40.1132: ICD-10-CM

## 2024-09-05 PROCEDURE — 99213 OFFICE O/P EST LOW 20 MIN: CPT | Performed by: OPHTHALMOLOGY

## 2024-09-05 ASSESSMENT — LID EXAM ASSESSMENTS: OD_TRICHIASIS: RLL

## 2024-09-05 ASSESSMENT — CONFRONTATIONAL VISUAL FIELD TEST (CVF)
OD_FINDINGS: FULL
OS_FINDINGS: FULL

## 2024-10-01 ENCOUNTER — TRANSCRIPTION ENCOUNTER (OUTPATIENT)
Age: 79
End: 2024-10-01

## 2024-10-02 ENCOUNTER — TRANSCRIPTION ENCOUNTER (OUTPATIENT)
Age: 79
End: 2024-10-02

## 2024-10-11 ENCOUNTER — TRANSCRIPTION ENCOUNTER (OUTPATIENT)
Age: 79
End: 2024-10-11

## 2024-11-06 ENCOUNTER — APPOINTMENT (OUTPATIENT)
Dept: VASCULAR SURGERY | Facility: CLINIC | Age: 79
End: 2024-11-06

## 2024-12-23 ENCOUNTER — APPOINTMENT (OUTPATIENT)
Dept: MAMMOGRAPHY | Facility: CLINIC | Age: 79
End: 2024-12-23
Payer: MEDICARE

## 2024-12-23 ENCOUNTER — APPOINTMENT (OUTPATIENT)
Dept: ULTRASOUND IMAGING | Facility: CLINIC | Age: 79
End: 2024-12-23
Payer: MEDICARE

## 2024-12-23 PROCEDURE — 77066 DX MAMMO INCL CAD BI: CPT

## 2024-12-23 PROCEDURE — 76641 ULTRASOUND BREAST COMPLETE: CPT | Mod: 50,GA

## 2024-12-23 PROCEDURE — G0279: CPT

## 2025-01-10 ENCOUNTER — OFFICE (OUTPATIENT)
Dept: URBAN - METROPOLITAN AREA CLINIC 38 | Facility: CLINIC | Age: 80
Setting detail: OPHTHALMOLOGY
End: 2025-01-10
Payer: MEDICARE

## 2025-01-10 DIAGNOSIS — H40.1133: ICD-10-CM

## 2025-01-10 PROCEDURE — 92083 EXTENDED VISUAL FIELD XM: CPT | Performed by: STUDENT IN AN ORGANIZED HEALTH CARE EDUCATION/TRAINING PROGRAM

## 2025-01-10 PROCEDURE — 92133 CPTRZD OPH DX IMG PST SGM ON: CPT | Performed by: STUDENT IN AN ORGANIZED HEALTH CARE EDUCATION/TRAINING PROGRAM

## 2025-01-10 PROCEDURE — 99214 OFFICE O/P EST MOD 30 MIN: CPT | Performed by: STUDENT IN AN ORGANIZED HEALTH CARE EDUCATION/TRAINING PROGRAM

## 2025-01-10 ASSESSMENT — REFRACTION_MANIFEST
OD_VA1: 20/30-
OU_VA: 20/20-2
OD_AXIS: 035
OD_VA2: 20/30(J2)
OS_CYLINDER: +2.25
OD_VA1: 20/30-
OS_SPHERE: -2.25
OS_VA1: 20/20-
OD_ADD: +3.00
OD_VA2: 20/30(J2)
OD_SPHERE: -1.75
OS_ADD: +2.75
OS_CYLINDER: +2.25
OS_SPHERE: -2.25
OD_CYLINDER: +1.50
OS_AXIS: 175
OS_VA2: 20/30(J2)
OD_CYLINDER: +1.50
OD_ADD: +2.75
OS_VA1: 20/20-
OS_AXIS: 175
OU_VA: 20/20-2
OS_VA2: 20/30(J2)
OD_SPHERE: -1.75
OS_ADD: +3.00
OD_AXIS: 035

## 2025-01-10 ASSESSMENT — PACHYMETRY
OD_CT_UM: 577
OD_CT_CORRECTION: -2
OS_CT_UM: 583
OS_CT_CORRECTION: -3

## 2025-01-10 ASSESSMENT — REFRACTION_AUTOREFRACTION
OS_SPHERE: +0.50
OD_AXIS: 110
OS_AXIS: 082
OS_CYLINDER: -3.00
OD_CYLINDER: -2.50
OD_SPHERE: +0.75

## 2025-01-10 ASSESSMENT — KERATOMETRY
OS_K2POWER_DIOPTERS: 43.00
OD_K1POWER_DIOPTERS: 40.25
OS_K1POWER_DIOPTERS: 41.00
OD_K2POWER_DIOPTERS: 42.25
OS_AXISANGLE_DEGREES: 179
OD_AXISANGLE_DEGREES: 014

## 2025-01-10 ASSESSMENT — REFRACTION_CURRENTRX
OD_VPRISM_DIRECTION: PROGS
OS_ADD: +2.00
OS_CYLINDER: -2.25
OS_SPHERE: PLANO
OD_AXIS: 130
OD_SPHERE: PLANO
OD_ADD: +2.00
OD_CYLINDER: -1.75
OS_AXIS: 085
OS_VPRISM_DIRECTION: PROGS
OS_OVR_VA: 20/
OD_OVR_VA: 20/

## 2025-01-10 ASSESSMENT — VISUAL ACUITY
OS_BCVA: 20/60-2
OD_BCVA: 20/25-1

## 2025-01-10 ASSESSMENT — TONOMETRY
OS_IOP_MMHG: 21
OD_IOP_MMHG: 20

## 2025-01-10 ASSESSMENT — LID EXAM ASSESSMENTS: OD_TRICHIASIS: RLL

## 2025-01-10 ASSESSMENT — CONFRONTATIONAL VISUAL FIELD TEST (CVF)
OD_FINDINGS: FULL
OS_FINDINGS: FULL

## 2025-01-24 ENCOUNTER — RX ONLY (RX ONLY)
Age: 80
End: 2025-01-24

## 2025-01-24 ENCOUNTER — OFFICE (OUTPATIENT)
Dept: URBAN - METROPOLITAN AREA CLINIC 38 | Facility: CLINIC | Age: 80
Setting detail: OPHTHALMOLOGY
End: 2025-01-24
Payer: MEDICARE

## 2025-01-24 DIAGNOSIS — H40.1133: ICD-10-CM

## 2025-01-24 DIAGNOSIS — H35.3132: ICD-10-CM

## 2025-01-24 DIAGNOSIS — Z96.1: ICD-10-CM

## 2025-01-24 DIAGNOSIS — H43.811: ICD-10-CM

## 2025-01-24 PROCEDURE — 92014 COMPRE OPH EXAM EST PT 1/>: CPT | Performed by: STUDENT IN AN ORGANIZED HEALTH CARE EDUCATION/TRAINING PROGRAM

## 2025-01-24 PROCEDURE — 92250 FUNDUS PHOTOGRAPHY W/I&R: CPT | Performed by: STUDENT IN AN ORGANIZED HEALTH CARE EDUCATION/TRAINING PROGRAM

## 2025-01-24 ASSESSMENT — LID EXAM ASSESSMENTS: OD_TRICHIASIS: RLL

## 2025-01-24 ASSESSMENT — CONFRONTATIONAL VISUAL FIELD TEST (CVF)
OD_FINDINGS: FULL
OS_FINDINGS: FULL

## 2025-01-27 ASSESSMENT — REFRACTION_MANIFEST
OS_AXIS: 175
OD_SPHERE: -1.75
OD_AXIS: 035
OS_AXIS: 175
OS_VA1: 20/20-
OU_VA: 20/20-2
OS_VA2: 20/30(J2)
OS_ADD: +3.00
OD_ADD: +2.75
OD_VA1: 20/30-
OS_VA2: 20/30(J2)
OS_CYLINDER: +2.25
OD_VA2: 20/30(J2)
OD_SPHERE: -1.75
OS_SPHERE: -2.25
OD_VA2: 20/30(J2)
OS_VA1: 20/20-
OS_ADD: +2.75
OD_CYLINDER: +1.50
OD_ADD: +3.00
OS_CYLINDER: +2.25
OD_VA1: 20/30-
OS_SPHERE: -2.25
OU_VA: 20/20-2
OD_AXIS: 035
OD_CYLINDER: +1.50

## 2025-01-27 ASSESSMENT — REFRACTION_AUTOREFRACTION
OS_CYLINDER: -3.00
OS_AXIS: 082
OS_SPHERE: +0.50
OD_CYLINDER: -2.50
OD_SPHERE: +0.75
OD_AXIS: 110

## 2025-01-27 ASSESSMENT — KERATOMETRY
OD_AXISANGLE_DEGREES: 014
OS_K1POWER_DIOPTERS: 41.00
OS_K2POWER_DIOPTERS: 43.00
OD_K2POWER_DIOPTERS: 42.25
OS_AXISANGLE_DEGREES: 179
OD_K1POWER_DIOPTERS: 40.25

## 2025-01-27 ASSESSMENT — VISUAL ACUITY
OD_BCVA: 20/25-1
OS_BCVA: 20/60-2

## 2025-01-27 ASSESSMENT — REFRACTION_CURRENTRX
OS_VPRISM_DIRECTION: PROGS
OD_OVR_VA: 20/
OD_AXIS: 130
OD_SPHERE: PLANO
OS_ADD: +2.00
OS_SPHERE: PLANO
OD_CYLINDER: -1.75
OD_ADD: +2.00
OS_CYLINDER: -2.25
OS_OVR_VA: 20/
OS_AXIS: 085
OD_VPRISM_DIRECTION: PROGS

## 2025-02-12 ENCOUNTER — OFFICE (OUTPATIENT)
Dept: URBAN - METROPOLITAN AREA CLINIC 38 | Facility: CLINIC | Age: 80
Setting detail: OPHTHALMOLOGY
End: 2025-02-12
Payer: MEDICARE

## 2025-02-12 ENCOUNTER — RX ONLY (RX ONLY)
Age: 80
End: 2025-02-12

## 2025-02-12 DIAGNOSIS — H43.811: ICD-10-CM

## 2025-02-12 DIAGNOSIS — H35.3132: ICD-10-CM

## 2025-02-12 DIAGNOSIS — H40.1133: ICD-10-CM

## 2025-02-12 PROCEDURE — 99214 OFFICE O/P EST MOD 30 MIN: CPT | Performed by: OPHTHALMOLOGY

## 2025-02-12 PROCEDURE — 92250 FUNDUS PHOTOGRAPHY W/I&R: CPT | Performed by: OPHTHALMOLOGY

## 2025-02-12 ASSESSMENT — KERATOMETRY
OD_K2POWER_DIOPTERS: 42.25
OS_K1POWER_DIOPTERS: 41.00
OS_AXISANGLE_DEGREES: 179
OD_K1POWER_DIOPTERS: 40.25
OS_K2POWER_DIOPTERS: 43.00
OD_AXISANGLE_DEGREES: 014

## 2025-02-12 ASSESSMENT — LID EXAM ASSESSMENTS: OD_TRICHIASIS: RLL

## 2025-02-12 ASSESSMENT — REFRACTION_AUTOREFRACTION
OD_AXIS: 110
OD_SPHERE: +0.75
OS_CYLINDER: -3.00
OS_AXIS: 082
OS_SPHERE: +0.50
OD_CYLINDER: -2.50

## 2025-02-12 ASSESSMENT — CONFRONTATIONAL VISUAL FIELD TEST (CVF)
OS_FINDINGS: FULL
OD_FINDINGS: FULL

## 2025-02-12 ASSESSMENT — VISUAL ACUITY
OD_BCVA: 20/70+2
OS_BCVA: 20/300